# Patient Record
Sex: FEMALE | Race: WHITE | HISPANIC OR LATINO | Employment: UNEMPLOYED | URBAN - METROPOLITAN AREA
[De-identification: names, ages, dates, MRNs, and addresses within clinical notes are randomized per-mention and may not be internally consistent; named-entity substitution may affect disease eponyms.]

---

## 2020-07-17 ENCOUNTER — NURSE TRIAGE (OUTPATIENT)
Dept: OTHER | Facility: OTHER | Age: 25
End: 2020-07-17

## 2020-07-17 DIAGNOSIS — Z20.828 SARS-ASSOCIATED CORONAVIRUS EXPOSURE: Primary | ICD-10-CM

## 2020-07-17 DIAGNOSIS — Z20.828 SARS-ASSOCIATED CORONAVIRUS EXPOSURE: ICD-10-CM

## 2020-07-17 PROCEDURE — U0003 INFECTIOUS AGENT DETECTION BY NUCLEIC ACID (DNA OR RNA); SEVERE ACUTE RESPIRATORY SYNDROME CORONAVIRUS 2 (SARS-COV-2) (CORONAVIRUS DISEASE [COVID-19]), AMPLIFIED PROBE TECHNIQUE, MAKING USE OF HIGH THROUGHPUT TECHNOLOGIES AS DESCRIBED BY CMS-2020-01-R: HCPCS

## 2020-07-17 NOTE — TELEPHONE ENCOUNTER
Regarding: corona virus  ----- Message from Micky Rubi sent at 7/17/2020  2:55 PM EDT -----  Pt's employer is requiring she be covid 23 tested and she needs an order for testing at [facility]

## 2020-07-17 NOTE — TELEPHONE ENCOUNTER
Reason for Disposition   [1] COVID-19 infection suspected by caller or triager AND [2] mild symptoms (cough, fever, or others) AND [7] no complications or SOB    Additional Information   [1] Symptoms of COVID-19 (e g , cough, fever, SOB, or others) AND [2] lives in an area with community spread    Answer Assessment - Initial Assessment Questions  1  CLOSE CONTACT: "Who is the person with the confirmed or suspected COVID-19 infection that you were exposed to?"      Denies known exposure but has had some symptoms for the last week  2  PLACE of CONTACT: "Where were you when you were exposed to COVID-19?" (e g , home, school, medical waiting room; which city?)      Denies known exposure  3  TYPE of CONTACT: "How much contact was there?" (e g , sitting next to, live in same house, work in same office, same building)      Denies known positive exposure  4  DURATION of CONTACT: "How long were you in contact with the COVID-19 patient?" (e g , a few seconds, passed by person, a few minutes, live with the patient)      Denies  5  DATE of CONTACT: "When did you have contact with a COVID-19 patient?" (e g , how many days ago)      Denies  6  TRAVEL: "Have you traveled out of the country recently?" If so, "When and where?"      * Also ask about out-of-state travel, since the CDC has identified some high-risk cities for community spread in the [address] [place] [address],[address]  * Note: Travel becomes less relevant if there is widespread community transmission where the patient lives  Denies  7  COMMUNITY SPREAD: "Are there lots of cases of COVID-19 (community spread) where you live?" (See public health department website, if unsure)        Uncertain  8  SYMPTOMS: "Do you have any symptoms?" (e g , fever, cough, breathing difficulty)      Has had a cough, headache, and body aches for a week  Her work is asking that she get tested for COVID in order to return to work  9   PREGNANCY OR POSTPARTUM: "Is there any chance you are pregnant?" "When was your last menstrual period?" "Did you deliver in the last 2 weeks?"      Denies  10  HIGH RISK: "Do you have any heart or lung problems? Do you have a weak immune system?" (e g , CHF, COPD, asthma, HIV positive, chemotherapy, renal failure, diabetes mellitus, sickle cell anemia)        Denies      Protocols used: CORONAVIRUS (COVID-19) DIAGNOSED OR SUSPECTED-ADULT-AH, CORONAVIRUS (COVID-19) EXPOSURE-ADULT-AH

## 2020-07-23 LAB — SARS-COV-2 RNA SPEC QL NAA+PROBE: NOT DETECTED

## 2020-07-24 ENCOUNTER — NURSE TRIAGE (OUTPATIENT)
Dept: OTHER | Facility: OTHER | Age: 25
End: 2020-07-24

## 2020-07-24 NOTE — TELEPHONE ENCOUNTER
The patient was called for notification of a test result for COVID-19  The patient did not answer the phone and a voicemail was left requesting a call back to [phone], Option 7

## 2021-09-21 ENCOUNTER — ULTRASOUND (OUTPATIENT)
Dept: OBGYN CLINIC | Facility: CLINIC | Age: 26
End: 2021-09-21

## 2021-09-21 VITALS
HEART RATE: 93 BPM | WEIGHT: 220 LBS | BODY MASS INDEX: 36.65 KG/M2 | HEIGHT: 65 IN | DIASTOLIC BLOOD PRESSURE: 73 MMHG | SYSTOLIC BLOOD PRESSURE: 110 MMHG

## 2021-09-21 DIAGNOSIS — Z3A.27 27 WEEKS GESTATION OF PREGNANCY: ICD-10-CM

## 2021-09-21 DIAGNOSIS — O09.30 LATE PRENATAL CARE: ICD-10-CM

## 2021-09-21 DIAGNOSIS — O99.210 OBESITY IN PREGNANCY: ICD-10-CM

## 2021-09-21 DIAGNOSIS — Z30.09 GENERAL COUNSELING AND ADVICE ON FEMALE CONTRACEPTION: ICD-10-CM

## 2021-09-21 DIAGNOSIS — Z23 NEED FOR TDAP VACCINATION: ICD-10-CM

## 2021-09-21 DIAGNOSIS — Z11.3 SCREENING FOR STDS (SEXUALLY TRANSMITTED DISEASES): ICD-10-CM

## 2021-09-21 DIAGNOSIS — Z34.92 PRENATAL CARE, SECOND TRIMESTER: Primary | ICD-10-CM

## 2021-09-21 PROBLEM — Z98.891 HISTORY OF CESAREAN DELIVERY: Status: ACTIVE | Noted: 2021-09-21

## 2021-09-21 PROCEDURE — 90715 TDAP VACCINE 7 YRS/> IM: CPT

## 2021-09-21 PROCEDURE — 90471 IMMUNIZATION ADMIN: CPT

## 2021-09-21 PROCEDURE — 87591 N.GONORRHOEAE DNA AMP PROB: CPT | Performed by: OBSTETRICS & GYNECOLOGY

## 2021-09-21 PROCEDURE — 87491 CHLMYD TRACH DNA AMP PROBE: CPT | Performed by: OBSTETRICS & GYNECOLOGY

## 2021-09-21 PROCEDURE — 99214 OFFICE O/P EST MOD 30 MIN: CPT | Performed by: OBSTETRICS & GYNECOLOGY

## 2021-09-21 RX ORDER — SWAB
1 SWAB, NON-MEDICATED MISCELLANEOUS DAILY
COMMUNITY
End: 2022-06-30 | Stop reason: SDUPTHER

## 2021-09-21 NOTE — PATIENT INSTRUCTIONS
El embarazo de la semana 27 a la 30   CUIDADO AMBULATORIO:   Qué cambios están ocurriendo en hancock cuerpo: Usted podría notar síntomas nuevos margaret falta de [place] o inflamación de scott tobillos y pies  Es posible que también tenga dificultad para dormir o contracciones  Busque atención médica de inmediato si:  · Usted presenta un sosa dolor de emmanuel que no desaparece  · Usted tiene cambios en la visión nuevos o en aumento, margaret visión borrosa o con manchas  · Usted tiene inflamación nueva o creciente en hancock fausto o tejas  · Usted tiene manchado o sangrado vaginal     · Usted rompe gisela o siente un chorro o gotas de agua tibia que le está bajando por hancock vagina  Comuníquese con hancock médico si:  · Usted tiene más de 5 contracciones en 1 hora  · Usted nota algún cambio en los movimientos de hancock bebé  · Usted tiene calambres, presión o tensión abdominal     · Usted tiene un cambio en la secreción vaginal     · Usted tiene escalofríos o fiebre  · Usted tiene comezón, ardor o dolor vaginal     · Usted tiene fernando secreción vaginal amarillenta, verdosa, horacio o de [employer]  · Usted tiene dolor o ardor al [place], orina menos de lo habitual o tiene [place] rosada o sanguinolenta  · Usted tiene preguntas o inquietudes acerca de hancock condición o cuidado  Cómo cuidarse en esta etapa de hancock embarazo:  · Consuma alimentos saludables y variados  Alimentos saludables incluyen frutas, verduras, panes de alicia integral, alimentos lácteos bajos en grasa, frijoles, julieth magras y pescado  [place] líquidos margaret se le haya indicado  Pregunte cuánto líquido debe barbara cada día y cuáles líquidos son los más adecuados para usted  Limite el consumo de cafeína a menos de [address]  Limite el consumo de pescado a 2 porciones cada semana  Escoja pescado con concentraciones bajas de melissa margaret atún al natural enlatado, camarón, salmón, bacalao o tilapia   No coma pescado con concentraciones altas de melissa margaret pez ga, caballa gigante, pargo rayado y tiburón  · Controle la acidez comiendo 4 o 5 comidas pequeñas cada día en vez de comidas grandes  Evite los alimentos picantes  · Controle la inflamación al [facility] [employer] y poner los pies en alto o elevados sobre [place]  · [address]  Hancock necesidad de ciertas vitaminas y [facility], margaret el ácido fólico, aumenta barbara el [facility]  Las vitaminas prenatales proporcionan algunas de las vitaminas y minerales adicionales que usted necesita  Las vitaminas prenatales también podrían ayudar a disminuir el riesgo de ciertos defectos de nacimiento  · Consulte con hancock médico acerca de hacer ejercicio  El ejercicio moderado puede ayudarla a mantenerse en forma  Hancock médico la ayudará a planear un programa de ejercicios que sea seguro para usted barbara hancock [facility]  · No fume  Fumar aumenta el riesgo de aborto espontáneo y otros problemas de allison barbara hancock [facility]  Fumar puede causar que hancock bebé nazca antes de tiempo o que pese menos al nacer  Solicite información a hancock médico si usted necesita ayuda para dejar de fumar  · No consuma alcohol  El alcohol pasa de hancock cuerpo al bebé a través de la placenta  Puede afectar el desarrollo del cerebro de hancock bebé y provocar el síndrome de alcoholismo fetal (SAF)  El SAF es un brittni de condiciones que causan problemas [place], de comportamiento y de crecimiento  · Consulte con hancock médico antes de barbara cualquier medicamento  Muchos medicamentos pueden perjudicar a hancock bebé si usted los mallika [address] [place] [address]  No tome ningún medicamento, vitaminas, hierbas o suplementos sin karis consultar con hancock Jennifer Maid  use drogas ilegales o de la johnson (margaret marihuana o cocaína) mientras está embarazada  Consejos de seguridad barbara el embarazo:  · Evite jacuzzis y saunas   No use un jacuzzi o un sauna mientras usted está [place], especialmente barbara el primer trimestre  Los [employer] y los saunas aumentan la temperatura de hancock bebé y el riesgo de defectos de nacimiento  · Evite la toxoplasmosis  [place] es fernando infección causada por comer carne cruda o estar cerca del excremento de un akil infectado  [place] puede causar malformaciones congénitas, aborto espontáneo y Orville Schein  Lávese las tejas después de tocar carne cruda  Asegúrese de que la carne esté john cocida antes de comerla  Evite los huevos crudos y la Gaylan Karvonen  Use guantes o pida que alguien la ayude a limpiar la caja de arena del akil mientras usted Cynthea Patella  Cambios que están ocurriendo con hancock bebé: Para las 30 semanas, hancock bebé podría pesar más de 3 libras  Hancock bebé podría medir alrededor de 11 pulgadas de alex desde la punta de la emmanuel hasta la rabadilla (parte inferior del bebé)  Ahora hancock bebé puede abrir y cerrar scott ojos  Las patadas y movimientos de hancock bebé son más gabe en dinh momento  Lo que necesita saber acerca del cuidado prenatal: Hancock médico le revisará hancock presión arterial y [place]  Es posible que también necesite lo siguiente:  · Los análisis de neeraj podrían realizarse para revisar signos de anemia o el tipo de [place]  · Un examen de orina también podría realizarse para revisarle el azúcar y la proteína  Estas son señales de diabetes gestacional o de infección  La proteína en hancock orina también podría ser fernando señal de preeclampsia  La preeclampsia es fernando condición que puede desarrollarse barbara la semana 21 o después en hancock embarazo  Esta provoca presión arterial neftali y [place] [employer] [place] con scott riñones y [place]  · Fernando vacuna contra difteria, tétanos y tos ferina y vacuna contra la gripe podría ser recomendado por hancock médico     · La detección de diabetes gestacional se realizará usando fernando prueba oral de tolerancia a la glucosa   Fernando prueba oral de tolerancia a la glucosa comienza con fernando revisión de los niveles de azúcar en la [place] después de que usted no haya comido por 8 horas  Después se le da fernando bebida de glucosa  Le revisan el nivel de azúcar en la neeraj después de 1 hora, 2 horas y algunas veces 3 horas  Los médicos analizarán si el nivel de azúcar en la neeraj aumenta después del primer análisis  · La altura uterina es fernando medición del útero para controlar el desarrollo de hancock bebé  [employer] por lo general es igual al número de [address] que usted tiene de embarazo  Hancock médico también podría revisar la posición de hancock bebé  · El ritmo cardíaco de hancock bebé será revisado  © Copyright [employer]. 2021 Information is for End User's use only and may not be sold, redistributed or otherwise used for commercial purposes  All illustrations and images included in CareNotes® are the copyrighted property of A D A [employer]  or [address] [place] [address] es sólo para uso en educación  Hancock intención no es darle un consejo médico sobre enfermedades o tratamientos  Colsulte con hancock Monico Alanna farmacéutico antes de seguir cualquier régimen médico para saber si es seguro y efectivo para usted

## 2021-09-21 NOTE — ASSESSMENT & PLAN NOTE
Due to time constraints, did not get to discuss all matters regarding prenatal care   At next visit, please continue to discuss:  - TOLAC v RLTCS and other details regarding birth plan  - Labor precautions  [facility] delivery location   - COVID vaccination    Pap smear: patient reports Pap smear was negative early this year; she will bring records next visit

## 2021-09-21 NOTE — PROGRESS NOTES
OB/GYN  PRENATAL H&P VISIT  Matias Boggss  2021  Dr Ace Reagan MD      SUBJECTIVE  Patient is a [id number] here for initial prenatal H&P  She is late to prenatal care  This is an unintended and desired pregnancy  FOB is currently in the hospitals, but plans to be present for the delivery  Patient had Nexplanon removed in early April  She remembers being sexually active with boyfriend on   She reports LMP of 2021, which would have given JANNET of 3/4/22 and current EGA of 16w4d  Patient reports having a Pap smear done in April, reports results were negative  She did not realize she was pregnant until a positive pregnancy test last month  Prior to then she simply thought she was gaining weight  She had 2-4 days of brownish, dark discharge in , and was not sure if that was a period  She started to feel the baby move around 3 weeks ago  She is currently doing well  She denies vaginal bleeding, cramping, leakage, abnormal discharge, nausea, vomiting  Review of Systems   All other systems reviewed and are negative  ObGyn Hx:  OB History    Para Term  AB Living   2 1 1 0 0 1   SAB TAB Ectopic Multiple Live Births   0 0 0 0 1      # Outcome Date GA Lbr Ministerio/2nd Weight Sex Delivery Anes PTL Lv   2 Current            1 Term 14   3221 g (7 lb 1 6 oz) M CS-LTranv EPI  SANJUANITA      Complications: Failure to Progress in First Stage       Hx STI: reports positive history in the past, treated    PMH:  No past medical history on file  Reports no significant medical history     Hx Tb/close contacts with persons with TB: no   Hx MRSA: no    Immunity to varicella: unknown    Family Hx:  No family history on file  She denies a family history of inheritable conditions such as physical or intellectual disabilities, birth defects, blood disorders, heart or neural tube defects        Current Medications:    Current Outpatient Medications:     Prenatal Vit-Fe Fumarate-FA (prenatal multivitamin) 28-0 8 MG TABS, Take 1 tablet by mouth daily, Disp: , Rfl:     Surgical Hx:  No past surgical history on file  Social Hx:  Tobacco use: no  Alcohol use: no  Other substance use: no  Currently employment: did not get to ask; currently self pay   Currently lives with her son  Support system includes family  She does feel safe at home    Hx mental health issues: no  Recent travel or planned travel in near future: [facility] earlier this year     OBJECTIVE  Vitals:    09/21/21 1446   BP: 110/73   Pulse: 93     Physical Exam  Constitutional:       Appearance: Normal appearance  She is obese  She is not ill-appearing or diaphoretic  Comments: Pleasant female   HENT:      Head: Normocephalic and atraumatic  Eyes:      Conjunctiva/sclera: Conjunctivae normal       Pupils: Pupils are equal, round, and reactive to light  Cardiovascular:      Rate and Rhythm: Normal rate  Pulmonary:      Effort: Pulmonary effort is normal  No respiratory distress  Abdominal:      General: There is distension (visibly gravid uterus)  Palpations: Abdomen is soft  Tenderness: There is no abdominal tenderness  There is no guarding  Genitourinary:     Comments: Normal appearing external genitalia, vaginal mucosa  High cervix, difficulty to entirely visualize on speculum exam but otherwise appears normal  No evidence of bleeding  Normal physiologic discharge present  On bimanual exam, uterus roughly 27 weeks in size, with no palpable adnexal masses  Musculoskeletal:         General: Normal range of motion  Cervical back: Normal range of motion  Skin:     General: Skin is warm and dry  Neurological:      General: No focal deficit present  Mental Status: She is alert and oriented to person, place, and time  Mental status is at baseline  Psychiatric:         Mood and Affect: Mood normal          Behavior: Behavior normal          Thought Content:  Thought content normal  Fetal Biometry:  FL: 4 72cm - 25w5d  AC: 24 20cm - 28w3d  HC: 25 99 cm - 28w2d  BPD: 7 28cm - 29w1d  FHT: 137 bpm using TAUS                       ASSESSMENT AND PLAN    30 y o , , with /73   Pulse 93   Ht 5' 5" (1 651 m)   Wt 99 8 kg (220 lb)   LMP 2021 , at roughly 27 weeks gestation, presents late to prenatal care  Viability scan via TAUS/biometry and H&P completed today  Problem List        Other    Prenatal care, second trimester    Overview     Continue prenatal vitamins   Delivery consent signed 470  Tdap given   BP's WNL thus far in pregnancy   28-week education packet reviewed with RN   F/u prenatal labs, G/C results  Patient past window for most genetic screening options but could have NIPT done  Will continue to discuss  Referral to [facility] placed          Current Assessment & Plan     Due to time constraints, did not get to discuss all matters regarding prenatal care  At next visit, please continue to discuss:  - TOLAC v RLTCS and other details regarding birth plan  - Labor precautions  [place] delivery location   - COVID vaccination    Pap smear: patient reports Pap smear was negative early this year; she will bring records next visit          27 weeks gestation of pregnancy    Overview     RTC 1 week for nursing intake   RTC Q2 weeks for routine prenatal care         History of C/S x 1    Overview     2014: 1LTCS for failed IOL  Patient reports being induced and not dilating past 3 cm    Undecided on TOLAC v RLTCS         Late prenatal care    Overview     Patient initially presented on  for first prenatal visit   Referral to [facility] for detailed anatomy scan          General counseling and advice on female contraception    Overview     Patient undecided, will continue to discuss          Obesity in pregnancy      Other Visit Diagnoses     Need for Tdap vaccination        Screening for STDs (sexually transmitted diseases)              Kenisha Eddy, MD  9/21/2021  7:07 PM

## 2021-09-21 NOTE — PROGRESS NOTES
28 week education packet provided to patient on 09/21/21  Included in packet:   Third Trimester paperwork  Delivery consent   Birthing room support person rules and acknowledgment  Birth Plan   Welcome information  Birth certificate worksheet   Consent for Photographers  Perineal/ Vaginal massage   Pediatric practices and locations

## 2021-09-23 LAB
C TRACH DNA SPEC QL NAA+PROBE: NEGATIVE
N GONORRHOEA DNA SPEC QL NAA+PROBE: NEGATIVE

## 2021-09-24 ENCOUNTER — TELEPHONE (OUTPATIENT)
Dept: OBGYN CLINIC | Facility: CLINIC | Age: 26
End: 2021-09-24

## 2021-09-24 ENCOUNTER — APPOINTMENT (OUTPATIENT)
Dept: LAB | Facility: HOSPITAL | Age: 26
End: 2021-09-24

## 2021-09-24 DIAGNOSIS — R82.71 ASYMPTOMATIC BACTERIURIA DURING PREGNANCY: Primary | ICD-10-CM

## 2021-09-24 DIAGNOSIS — O99.891 ASYMPTOMATIC BACTERIURIA DURING PREGNANCY: Primary | ICD-10-CM

## 2021-09-24 DIAGNOSIS — Z34.92 PRENATAL CARE, SECOND TRIMESTER: ICD-10-CM

## 2021-09-24 LAB
ABO GROUP BLD: NORMAL
BASOPHILS # BLD AUTO: 0.02 THOUSANDS/ΜL (ref 0–0.1)
BASOPHILS NFR BLD AUTO: 0 % (ref 0–1)
BILIRUB UR QL STRIP: NEGATIVE
BLD GP AB SCN SERPL QL: NEGATIVE
CLARITY UR: NORMAL
COLOR UR: YELLOW
EOSINOPHIL # BLD AUTO: 0.01 THOUSAND/ΜL (ref 0–0.61)
EOSINOPHIL NFR BLD AUTO: 0 % (ref 0–6)
ERYTHROCYTE [DISTWIDTH] IN BLOOD BY AUTOMATED COUNT: 14 % (ref 11.6–15.1)
GLUCOSE UR STRIP-MCNC: NEGATIVE MG/DL
HBV SURFACE AG SER QL: NORMAL
HCT VFR BLD AUTO: 31 % (ref 34.8–46.1)
HGB BLD-MCNC: 10 G/DL (ref 11.5–15.4)
HGB UR QL STRIP.AUTO: NEGATIVE
IMM GRANULOCYTES # BLD AUTO: 0.05 THOUSAND/UL (ref 0–0.2)
IMM GRANULOCYTES NFR BLD AUTO: 1 % (ref 0–2)
KETONES UR STRIP-MCNC: NEGATIVE MG/DL
LEUKOCYTE ESTERASE UR QL STRIP: NEGATIVE
LYMPHOCYTES # BLD AUTO: 1.36 THOUSANDS/ΜL (ref 0.6–4.47)
LYMPHOCYTES NFR BLD AUTO: 17 % (ref 14–44)
MCH RBC QN AUTO: 29.6 PG (ref 26.8–34.3)
MCHC RBC AUTO-ENTMCNC: 32.3 G/DL (ref 31.4–37.4)
MCV RBC AUTO: 92 FL (ref 82–98)
MONOCYTES # BLD AUTO: 0.34 THOUSAND/ΜL (ref 0.17–1.22)
MONOCYTES NFR BLD AUTO: 4 % (ref 4–12)
NEUTROPHILS # BLD AUTO: 6.41 THOUSANDS/ΜL (ref 1.85–7.62)
NEUTS SEG NFR BLD AUTO: 78 % (ref 43–75)
NITRITE UR QL STRIP: NEGATIVE
NRBC BLD AUTO-RTO: 0 /100 WBCS
PH UR STRIP.AUTO: 6 [PH]
PLATELET # BLD AUTO: 179 THOUSANDS/UL (ref 149–390)
PMV BLD AUTO: 8.7 FL (ref 8.9–12.7)
PROT UR STRIP-MCNC: NEGATIVE MG/DL
RBC # BLD AUTO: 3.38 MILLION/UL (ref 3.81–5.12)
RH BLD: POSITIVE
RPR SER QL: NORMAL
RUBV IGG SERPL IA-ACNC: 64.2 IU/ML
SP GR UR STRIP.AUTO: 1.02 (ref 1–1.03)
SPECIMEN EXPIRATION DATE: NORMAL
UROBILINOGEN UR QL STRIP.AUTO: 1 E.U./DL
VZV IGG SER IA-ACNC: NORMAL
WBC # BLD AUTO: 8.19 THOUSAND/UL (ref 4.31–10.16)

## 2021-09-24 PROCEDURE — 36415 COLL VENOUS BLD VENIPUNCTURE: CPT

## 2021-09-24 PROCEDURE — 81003 URINALYSIS AUTO W/O SCOPE: CPT

## 2021-09-24 PROCEDURE — 87086 URINE CULTURE/COLONY COUNT: CPT

## 2021-09-24 PROCEDURE — 80081 OBSTETRIC PANEL INC HIV TSTG: CPT

## 2021-09-24 PROCEDURE — 86787 VARICELLA-ZOSTER ANTIBODY: CPT

## 2021-09-24 RX ORDER — CEPHALEXIN 500 MG/1
500 CAPSULE ORAL EVERY 6 HOURS SCHEDULED
Qty: 28 CAPSULE | Refills: 0 | Status: SHIPPED | OUTPATIENT
Start: 2021-09-24 | End: 2021-09-28 | Stop reason: CLARIF

## 2021-09-24 NOTE — TELEPHONE ENCOUNTER
----- Message from Oleg Sequeira MD sent at 9/24/2021 11:58 AM EDT -----  Patient has nitirites in her urine on prenatal panel; likely UTI  Sent Kelfex to pharmacy listed in her chart  Please call her and notify her of urine infection, and advise her to  antibiotics and start them ASAP  Keflex 500mg Q6h x 7 days

## 2021-09-24 NOTE — TELEPHONE ENCOUNTER
Called patient to notify of results and to relate that antibiotic was sent to the pharmacy  Left message with call back telephone number

## 2021-09-24 NOTE — LETTER
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

## 2021-09-24 NOTE — PROGRESS NOTES
Patient has nitrites noted in urine on UA from prenatal panel  Likely UTI/aysmptomatic bacteruria  Will send Keflex 500mg Q6h x 7 days to pharmacy   Will await urine culture and sensitivities to assess need for antibiotic adjustment     Addendum 9/28/2021: On review of prenatal labs, patient does NOT have nitrites in her urine   She does not have asymptomatic bacteruria and does not need antibiotic treatment

## 2021-09-26 LAB — HIV 1+2 AB+HIV1 P24 AG SERPL QL IA: NORMAL

## 2021-09-27 ENCOUNTER — ROUTINE PRENATAL (OUTPATIENT)
Dept: PERINATAL CARE | Facility: OTHER | Age: 26
End: 2021-09-27
Payer: COMMERCIAL

## 2021-09-27 VITALS
WEIGHT: 220.6 LBS | HEIGHT: 65 IN | DIASTOLIC BLOOD PRESSURE: 75 MMHG | HEART RATE: 92 BPM | BODY MASS INDEX: 36.75 KG/M2 | SYSTOLIC BLOOD PRESSURE: 112 MMHG

## 2021-09-27 DIAGNOSIS — O99.213 MATERNAL OBESITY, ANTEPARTUM, THIRD TRIMESTER: Primary | ICD-10-CM

## 2021-09-27 DIAGNOSIS — Z3A.28 28 WEEKS GESTATION OF PREGNANCY: ICD-10-CM

## 2021-09-27 DIAGNOSIS — O09.33 LATE PRENATAL CARE AFFECTING PREGNANCY IN THIRD TRIMESTER: ICD-10-CM

## 2021-09-27 DIAGNOSIS — Z98.891 HISTORY OF CESAREAN DELIVERY: ICD-10-CM

## 2021-09-27 LAB — BACTERIA UR CULT: NORMAL

## 2021-09-27 PROCEDURE — 76811 OB US DETAILED SNGL FETUS: CPT | Performed by: OBSTETRICS & GYNECOLOGY

## 2021-09-27 PROCEDURE — 99241 PR OFFICE CONSULTATION NEW/ESTAB PATIENT 15 MIN: CPT | Performed by: OBSTETRICS & GYNECOLOGY

## 2021-09-27 NOTE — LETTER
September 27, 2021     [address] PROVIDER    Patient: Orville Gomez   YOB: 1995   Date of Visit: 9/27/2021       Dear   Provider: Thank you for referring Mariajose Figueredo to me for evaluation  Below are my notes for this consultation  If you have questions, please do not hesitate to call me  I look forward to following your patient along with you  Sincerely,        Melva Clifton MD        CC: No Recipients  Melva Clifton MD  9/26/2021  5:55 PM  Sign when Signing Visit  Please refer to the [facility] ultrasound report in Ob Procedures for additional information regarding today's visit

## 2021-09-27 NOTE — PATIENT INSTRUCTIONS
Conteo de patadas en el embarazo   LO QUE NECESITA SABER:   El conteo de patadas mide cuánto se está moviendo hancock bebé en el útero  Fernando patada de hancock bebé podría sentirse margaret fernando torcedura, fernando vuelta, un crujido, un meneo o un golpe  Es común sentir a tu bebé patear a las 32 a 29 semanas de [place]  Es posible que sienta al bebé patear ya a las 20 semanas de [place]  Puede que desee empezar a contar a las 28 semanas  INSTRUCCIONES SOBRE EL [facility] HOSPITALARIA:   Comuníquese inmediatamente con hancock médico si:  · Usted siente un cambio en el número de patadas o movimientos de hancock bebé  · Siente menos de 10 patadas en 2 horas  · Usted tiene preguntas o inquietudes acerca de los movimientos de hancock bebé  Por qué realizar el conteo de patadas: Los movimientos de hancock bebé podrían proporcionar información de la allison de hancock bebé  Es posible que si hay problemas, hancock bebé se mueva menos o nada en lo absoluto  El bebé podría moverse menos si no recibe suficiente oxígeno o alimento de la placenta  No fume mientras está embarazada  Fumar disminuye la cantidad de oxígeno que llega a hancock bebé  Hable con hancock médico si necesita ayuda para dejar de fumar  Los problemas que se encuentran en fernando etapa más temprana son más fáciles de tratar  Cuándo realizar el conteo de patadas:  · Cuente las patadas en el mismo horario todos los GRASSE  · Realice el conteo de las patadas cuando hancock bebé esté despierto y [place]  Hancock bebé podría estar más activo en la tarde  Cómo realizar el conteo de patadas: Revise que hancock bebé esté despierto antes de realizar el conteo de patadas  Usted puede despertar a hancock bebé empujando hancock estómago suavemente, caminando o tomando algo frío  Hancock médico podría indicarle diferentes maneras de realizar el conteo  Es posible que le indique que hawa lo siguiente:  · Use fernando gráfica o un reloj para mantener un registro de la hora en que comienza y termina de contar      · Siéntese en fernando silla o acuéstese en hancock costado derecho  · Coloque scott tejas en la parte más staci de ayala BJURHOLM  · Cuente hasta que llegue a las 10 patadas  Escriba cuánto tiempo le lleva contar las 10 patadas  · Podría barbara de 30 minutos a 2 horas para contar 10 patadas  No debería de barbara más de 2 horas para contar 10 patadas  Acuda a scott consultas de control con ayala médico según le indicaron  Anote scott preguntas para que se acuerde de hacerlas barbara scott visitas  © Copyright [address] 2021 Information is for End User's use only and may not be sold, redistributed or otherwise used for commercial purposes  All illustrations and images included in CareNotes® are the copyrighted property of A D A [employer]  or [address] [place] [address] es sólo para uso en educación  Ayala intención no es darle un consejo médico sobre enfermedades o tratamientos  Colsulte con ayala Dorna Arcadio farmacéutico antes de seguir cualquier régimen médico para saber si es seguro y efectivo para usted

## 2021-09-28 ENCOUNTER — DOCUMENTATION (OUTPATIENT)
Dept: OBGYN CLINIC | Facility: CLINIC | Age: 26
End: 2021-09-28

## 2021-09-28 ENCOUNTER — INITIAL PRENATAL (OUTPATIENT)
Dept: OBGYN CLINIC | Facility: CLINIC | Age: 26
End: 2021-09-28

## 2021-09-28 VITALS
HEIGHT: 65 IN | DIASTOLIC BLOOD PRESSURE: 60 MMHG | HEART RATE: 92 BPM | BODY MASS INDEX: 36.75 KG/M2 | SYSTOLIC BLOOD PRESSURE: 92 MMHG | WEIGHT: 220.6 LBS

## 2021-09-28 DIAGNOSIS — Z3A.29 29 WEEKS GESTATION OF PREGNANCY: Primary | ICD-10-CM

## 2021-09-28 PROCEDURE — OBC

## 2021-09-28 NOTE — LETTER
[address]  1995  [address] [facility]       09/28/21          Edwin Alarcon is a patient and under our care in our office  Edwin Souza's Estimated Date of Delivery: 12/10/21  Any questions or concerns feel free to contact our office       Thank you,    Karina Valles, [address] [facility]/[place]  [id number] [facility] [address]  [address] [facility]/[address]al [address]  [facility]/[address]  [phone]

## 2021-09-28 NOTE — PROGRESS NOTES
OB Nursing Intake   Patient presents for OB intake @ 29w4d    Vitals for current encounter: Blood pressure 92/60, pulse 92, height 5' 5" (1 651 m), weight 100 kg (220 lb 9 6 oz), last menstrual period 2021, not currently breastfeeding  BMI Counseling: Body mass index is 36 71 kg/m²  is above normal  Nutrition recommendations include 3-5 servings of fruits/vegetables daily  Hx of  delivery prior to 36 weeks 6 days:  No      OB History    Para Term  AB Living   2 1 1 0 0 1   SAB TAB Ectopic Multiple Live Births   0 0 0 0 1      # Outcome Date GA Lbr Ministerio/2nd Weight Sex Delivery Anes PTL Lv   2 Current            1 Term 14   3221 g (7 lb 1 6 oz) M CS-LTranv EPI  SANJUANITA      Complications: Failure to Progress in First Stage       Last Menstrual Period:    Patient's last menstrual period was 2021  Ultrasound date: 21  29 weeks 3 days    Estimated Date of Delivery: 12/10/2021 Confirmed by Ultrasound     Last pap smear:  Unknown, no records available  Pt to bring pap record at next visit  Current Issues:  Constipation :   No  Headaches :   No  Cramping:  No  Spotting :   No  PICA cravings :  No  FOB Involved:   Yes  Planned pregnancy:  No      I have these concerns about this prenatal patient: Late prenatal care  Pt hgb 10 on prenatal panel  Patient advised to take extra iron supplement and Colace for preventative constipation  Patient to follow up with provider for further discussions  Patient with primary family history of diabetes, 1 hour Gtt ordered  Interview education   [facility] Pregnancy Essentials reviewed and discussed    [facility] Handout   [facility] Handout   Discussed genetic testing   Prenatal lab work: Scripts printed and given to pt   Influenza vaccine given today: No   Discussed Tdap vaccine         Immunizations:   Immunization History   Administered Date(s) Administered    DTaP 1994, 1994, 05/24/1994, 06/25/1995, 05/25/1996    H1N1, All Formulations 10/28/2009    HPV Quadrivalent 07/07/2009, 04/14/2010, 05/31/2011    Hep B, Adolescent or Pediatric 07/07/2009, 04/14/2010, 10/20/2010    Hepatitis A 04/14/2010, 05/31/2011    INFLUENZA 10/28/2009, 10/22/2010, 11/21/2014    IPV 01/18/1994, 03/18/1994, 04/24/1994, 05/25/1999    MMR 07/07/2009, 04/14/2010    Meningococcal MCV4P 07/07/2009    Td (adult), adsorbed 12/22/1999    Tdap 04/14/2010, 09/21/2021    Tuberculin Skin Test-PPD Intradermal 11/21/2014    Varicella 04/07/2009, 04/14/2010         Depression Screening Follow-up Plan: Patient's depression screening was negative with an Parker score of  8  Clinically patient does not have depression  No treatment is required       Nurse/Family Partnership- referral placed:  No     Tobacco Cessation Counseling: non-smoker      Infection Screening: Does the pt have a hx of MRSA? No  The patient was oriented to our practice and all questions were answered  H&P visit scheduled on: 10/6/21 @ [phone]  with Dr Lisette Arzola by:  Rodríguez Petersen RN 09/28/21

## 2021-09-28 NOTE — LETTER
Proof of Pregnancy Letter    Edwin Toribio  1995  [address] [facility]        09/28/21      Edwin Toribio is a patient at our facility  [address] Estimated Date of Delivery: 12/10/2021       Any questions or concerns, please feel free to contact our office      Sincerely,    Joe Up RN

## 2021-09-28 NOTE — PATIENT INSTRUCTIONS
[place] 1925 ¡Felicitaciones por Tamsen Hiro! Nos complace que nos haya elegido para ser hancock proveedor de scott servicios de allison médica barbara hancock Jerral Ruts  Hancock allison, la allison de hancock hijo por nacer (niños) y hancock bre son importante para nosotros  Ahora, más que [place], hancock allison será lo más importante para usted  El crecimiento y el progreso de hancock bebé pueden verse afectados por la forma en que usted se cuide  Es Carren Quince buena idea planificar con anticipación  Es un hecho conocido que las mujeres que reciben atención mèdica desde temprano en  Jolinda Radon y barbara todo el embarazo tienen bebés más saludables  Se programarán visitas para usted barbara todo Jolinda Radon  Es hancock deber cumplir con scott citas y seguir las instrucciones para hancock cuidado  El Geoff Casino de visitas será decidido por hancock médico  No tengas miedo de hacer preguntas  Hable con scott enfermeras y proveedores sobre scott planes de parto y cualquier inquietud que pueda tener  [place] de Verificación  ; Medicina Materno Fetal (MFM) al [phone] para programar hancock cony   o Cony de 1 hora, solo se permite 1 persona de apoyo, NO niños    ; Análisis de neeraj: complete antes de hancock cony de H&P   NO tiene que ayunar para ningún análisis de neeraj a menos que se lo indiquen  - CBC, Tipo de [place] y 200 [employer] de anticuerpos, Análisis de [place], [place] de glucosa al richard, VIH, sífilis, hepatitis B    ; Ben [place] Cocker y física: cony de H&P   examen físico   Examen pélvico: Villatoro Orn y Clamidia   Si es necesario: Papanicolaou    Plan:  ; Visitas prenatales de rutina cada 4 semanas hasta las 28 semanas   En scott visitas prenatales:  - Monitoreo de los el latidos del corazón del bebé y medir hancock josé miguel en crecimiento, Recolecte fernando [facility], y se tomara hancock peso y presión arterial      Señales de Alerta en el embarazo: Anguillan Kidney  [phone]    1  Sangrado vaginal  2   Dolor abdominal litzy que no desaparece  3  Fiebre (más de 100 4 y no se kay con Tylenol)  4  Vómitos persistentes que mathews más de 24 horas  5  dolor de pecho  6  Dolor o ardor al orinar  7  Dolor de emmanuel severo que no se resuelve con Tylenol  8  Visión borrosa o addi puntos en hancock visión  9  Hinchazón repentina de hancock fausto o tejas  10  Enrojecimiento, hinchazón o dolor en fernando pierna  11  Un aumento de peso repentino en pocos días  12  Contar los movimientos fetales del bebé  (después de 28 semanas o el sexto mes de embarazo)  15  Fernando pérdida de líquido acuoso de la vagina: puede ser un chorro, un goteo o fernando humedad continua  14  Después de 20 semanas de embarazo, calambres rítmicos (más de 4 por hora) o menstruales margaret dolor bajo / pélvico      Manténgase saludable barbara hancock embarazo:   · Consuma alimentos saludables y variados  Alimentos saludables incluyen frutas, verduras, panes de alicia integral, alimentos lácteos bajos en grasa, frijoles, julieth magras y pescado  [place] líquidos margaret se le haya indicado  Pregunte cuánto líquido debe barbara cada día y cuáles líquidos son los más adecuados para usted  o Cafeína: no está zan cómo la cafeína afecta el embarazo  Limite hancock consumo de cafeína para evitar posibles problemas de allison   - Limite la cafeína a menos de 200 miligramos por día  - La cafeína se puede encontrar en el café, el té, la cola, las bebidas deportivas y el chocolate  o  Alimentos que contienen melissa: el melissa se encuentra naturalmente en lorraine todos los tipos de pescados y mariscos  Algunos tipos de peces absorben niveles más altos de melissa que pueden ser dañinos para un bebé shiela  Coma solo pescado y mariscos bajos en melissa  - Limite hancock consumo de pescado a 2 porciones por semana    - Elija pescado con bajo contenido de melissa, margaret atún zan enlatado, camarones, cangrejo, salmón, bacalao o tilapia   o No coma pescado con alto contenido de [employer] pez ga, el pez Jared monroe, la caballa real y el tiburón  - Cada semana, puede comer hasta 12 onzas de pescado o mariscos que tienen bajos niveles de [employer]  Estos incluyen camarones, atún zan enlatado, salmón, abadejo y [place]  o Coma solo 6 onzas de atún minaya (minaya) por semana  El atún minaya tiene más melissa que el atún [place]  · [address] [place]  Hancock necesidad de ciertas vitaminas y [facility], margaret el ácido fólico, aumenta barbara el [facility]  Las vitaminas prenatales proporcionan algunas de las vitaminas y minerales adicionales que usted necesita  Las vitaminas prenatales también podrían ayudar a disminuir el riesgo de ciertos defectos de nacimiento  · Pregunte cuánto peso usted debe aumentar barbara hancock embarazo  Demasiado aumento de peso o muy poco puede ser poco saludable para usted y hancock bebé  · Ejercicio: hable con hancock proveedor de [employer] ejercicio  El ejercicio moderado puede ayudarlo a mantenerse en forma  Hancock proveedor de [employer] ayudará a planificar un programa de ejercicios que sea seguro para usted barbara el [facility]  · Stacey muchos líquidos mientras hace ejercicio para mantenerse hidratado  Tenga cuidado para evitar el sobrecalentamiento  o EVITE los ejercicios que pueden hacer que pierda el equilibrio   o Actividades que pueden poner a hancock bebé en riesgo, es decir, montar a juliane, bucear, esquiar o hacer snowboard  o Después de hancock primer trimestre, evite los ejercicios que requieren que se acueste boca arriba   o EVITE exceder fernando frecuencia cardíaca mayor de 140 latidos por minuto  Siempre que pueda mantener fernando conversación mientras hace ejercicio, es probable que hancock ritmo cardíaco sea aceptable   ; Siempre use el cinturón de seguridad   El cinturón de hombro debe estar sobre el pecho (entre los senos) y lejos del olvin     Asegure el cinturón de regazo debajo de hancock vientre para que se ajuste cómodamente en scott caderas y Phan pélvico   ; Realizar el ejercicio de [place]   Imagínese deteniendo el flujo de orina, contrayendo los músculos de hancock piso pélvico  Mantenga maximilian contracción barbara 10 segundos y suelte   Se pueden repetir 10-20 veces por día  · No fume  Si usted fuma, nunca es demasiado tarde para dejar de hacerlo  Fumar aumenta el riesgo de aborto espontáneo y otros problemas de allison barbara hancock Leata Spire  Fumar puede causar que hancock bebé nazca antes de tiempo o que pese menos al nacer  Solicite información a hancock médico si usted necesita ayuda para dejar de fumar  · No consuma alcohol  El alcohol pasa de hancock cuerpo al bebé a través de la placenta  Puede afectar el desarrollo del cerebro de hancock bebé y provocar el síndrome de alcoholismo fetal (SAF)  SAF es un brittni de condiciones que causan [address], de comportamiento y de crecimiento  · Consulte con hancock médico antes de barbara cualquier medicamento  Muchos medicamentos pueden perjudicar a hancock bebé si usted los mallika [address]  No tome ningún medicamento, vitaminas, hierbas o suplementos sin karis consultar con hancock médico    · Nunca  use drogas ilegales o de la johnson (margaret marihuana o cocaína) mientras está embarazada  Consejos de seguridad:   · Evite jacuzzis y saunas  No use un jacuzzi o un sauna mientras usted está embarazada, especialmente barbara el primer trimestre  Los [facility] y los saunas aumentan la temperatura de hancock bebé y el riesgo de defectos de nacimiento  · Evite la toxoplasmosis  Betances es fernando infección causada por comer carne cruda o estar cerca del excremento de un akil infectado  Betances puede causar malformaciones congénitas, aborto espontáneo y Orville Schein  Lávese las tejas después de tocar carne cruda  Asegúrese de que la carne esté john cocida antes de comerla  Evite los huevos crudos y la Elana Days  Use guantes o pida que alguien la ayude a limpiar la caja de arena del akil mientras usted Josh Snuffer     · Consulte con hancock médico acerca de viajar  El [address] cómodo para viajar es barbara el emily trimestre  Pregunte a hancock médico si usted puede viajar después de las 36 semanas  Es posible que no pueda viajar en avión después de las 36 [address]  También le puede recomendar que evite largos viajes por carretera  Programe un control con hancock médico u obstetra según lo indicado:  Vaya a todas miranda citas prenatales barbara hancock embarazo  Anote miranda preguntas para que se acuerde de hacerlas barbara miranda visitas  [place] y vómito en el embarazo       CUIDADO AMBULATORIO:   La náusea y el vómito en el embarazo  pueden suceder a cualquier hora del día  Estos síntomas usualmente comienzan antes de la semana 9 del embarazo y terminan para la semana 14 (emily trimestre)  Algunas mujeres pueden tener náusea o vómito por un tiempo prolongado  Estos síntomas pueden afectar a algunas mujeres barbara el embarazo  La náusea y el vómito no dañan a hancock bebé  Estos síntomas pueden dificultarle miranda actividades diarias  Pregúntele a hancock Brooke Ricardo vitaminas y minerales son adecuados para usted  · Usted vomita más de 4 veces en 1 día  · Usted no ha podido retener líquidos en el estómago por más de 1 día  · Usted pierde más de 2 libras  · Usted tiene fiebre  · Miranda náuseas y vómito continúan por más de 14 semanas  · Usted tiene preguntas o inquietudes acerca de hancock condición o cuidado  El tratamiento  para la náusea y el vómito en el embarazo generalmente no es necesario  Usted puede [employer] alimentos que come y en miranda actividades para ayudar a controlar miranda síntomas  Es posible que usted necesite probar varias cosas para determinar qué funciona mejor para usted  Hable con hancock médico si miranda síntomas no mejoran con los cambios que se recomiendan a continuación  Es posible que usted necesite vitamina B6 y medicamento si estos cambios no ayudan o si miranda síntomas se vuelven graves     Cambios de nutrición que usted puede realizar para controlar la náusea y el vómito:   · Coma porciones pequeñas barbara el día en vez de 3 comidas con porciones grandes  Es más probable que usted tenga náusea y vómito cuando hancock estómago está vacío  Consuma alimentos bajos en grasa y ricos en proteínas  Ejemplos son Lyssa Larson, frijoles, pavo y luis sin fadumo Jay, margaret galletas saladas, cereal seco o un sandwich chico antes de [place]  · Coma galletas saladas o pan eliezer antes de levantarse de hancock cama por la mañana  Levántese de la cama lentamente  Los movimientos repentinos podrían provocarle mareos y [place]  · Consuma alimentos blandos cuando se sienta con náuseas  Ejemplos de alimentos blandos son el pan eliezer, cereal seco, pasta sin Aarti Tavia y wells  Otros alimentos blandos incluyen a las [employer], plátanos, gelatina y pretzels  Evite los alimentos condimentados, grasosos y fritos  Evite otros alimentos que le provoquen náuseas  · [place] líquidos que contengan gengibre  [place] refresco de gengibre hecho con gengibre real o té de gengibre hecho con gengibre fresco rallado  Las [employer] o dulces de gengibre también podrían ayudar a aliviar la náusea y el vómito  · [place] líquidos entre alimentos en vez de tomarlos con los alimentos  [place]spe[place] al menos 30 minutos después de comer para barbara líquidos  [place] cantidades pequeñas de líquidos con frecuencia barbara el día para evitar la deshidratación  Consulte cuál es la cantidad de líquido que usted debería consumir al día  Otros cambios que usted puede realizar para controlar la náusea y el vómito:   · Evite los olores que la [place]  Los olores gabe podrían provocar que [employer] náuseas y el vómito, o podrían empeorarlo  Camine un poco, prenda un ventilador o trate de dormir con la ventana abierta para respirar aire fresco  Cuando esté cocinando, brandon las ventanas para eliminar el olor que podría provocarle náuseas    · No se cepille scott dientes inmediatamente después de comer  si eso le provoca náuseas  · Descanse cuando lo necesite  Comience fernando actividad lentamente y vuelva a hancock rutina normal conforme se empiece a sentir mejor  · Hable con hancock médico acerca de las vitaminas prenatales  Las vitaminas prenatales pueden provocar náuseas a algunas mujeres  Trate de tomárselas por la noche o con un bocadillo  Si dinh cambio no le [facility], hancock médico podría recomendarle un tipo de vitamina diferente  · No use ningún medicamento, vitamina o suplemento para controlar scott síntomas sin antes consultarlo con hancock médico   Varios medicamentos pueden dañar a hancock bebé que no ha nacido  · El ejercicio de ligero a moderado  podría ayudar a aliviar scott síntomas  También podría ayudarla a dormir mejor por la noche  Pregunte a hancock médico acerca del mejor plan de ejercicio para usted  Beneficios de la lactancia materna   son menos propensos a desarrollar alergias   Tienen fernando mayor protección contra la meningitis bacteriana   Tienen menos infecciones del oído medio   Tienen menos dificultades de aprendizaje    Tienen menos dificultades de comportamiento   Tienen un coeficiente intelectual más alto   Tienen tasas más bajas de enfermedades respiratorias   Tienen chayito obesidad    Son menos propensos a desarrollar diabetes juvenil y algunos cánceres infantiles   Tienen menos probabilidades de morir por Síndrome de ertBayCare Alliant Hospital   Un bebé más saludable significa menos visitas al médico y a la farmacia   La lactancia materna es ecológica  No hay nada que tirar   La lactancia materna es gratis; La fórmula puede costar más de $ 1000 barbara el primer año de shahid   Hay menos sangrado vaginal inmediatamente después del parto y un retorno más rápido a hancock tamaño anterior al [facility]  Las [place] que amamantan barbara un total de 2 años tienen  ;  Fernnado disminución del 40% en el riesgo de cáncer de seno  ; Disminución del riesgo de cáncer de ovario   ; Tasas más bajas de osteoporosis, diabetes y enfermedades del corazón  Importancia de la lactancia materna exclusiva   Al proporcionar el alimento ideal para el crecimiento y desarrollo saludable de los bebés, solo se les alimenta con [place], esto es amamantamiento exclusivo   La lactancia materna [facility] primeros 6 meses es muy importante para mejorar la allison de un bebé y reducir las enfermedades infantiles  Lactancia materna exclusiva barbara los primeros 6 meses  [facility] Est[facility]ounid[facility] de Pediatría recomienda la lactancia materna exclusiva barbara los primeros seis meses y la lactancia materna continua con suplementos de sólidos barbara los próximos 6 meses  Inicio temprano de la lactancia materna   Las mujeres que alimentan a scott bebés dentro de la primera hora de nacimiento se conocen margaret inicio temprano de la lactancia materna y aseguran que el recién nacido reciba calostro   El calostro es rico en anticuerpos y nutrientes esenciales  Compartiendo la habitación   Puede estabilizar la respiración y la frecuencia cardíaca del recién nacido   Reduce el llanto   Mejorar la capacidad del recién nacido para mantener la temperatura y los niveles de azúcar en la neeraj   Estimulación temprana del sistema inmunitario del bebé   efectos calmantes   Enlace más fácil y rápido   El compartir la habitación promueve conocer a hancock recién nacido   El alojamiento conjunto facilita la lactancia materna   Las mujeres que se alojan con scott recién nacidos producen [place] y producen un buen suministro de [place]   Se hace más fácil reconocer las señales de alimentación del bebé   Los bebés tienen sesiones de lactancia más largas   Las mujeres que comparten habitación con scott recién nacidos tienen más probabilidades de amamantar exclusivamente en comparación con las mujeres que están separadas de scott recién nacidos    Piel con piel   El contacto piel con piel debe ocurrir independientemente de la preferencia de alimentación de fernando torin o el modo de [place]   Después del parto de hancock bebé, es importante que fernando madre cayden y hancock bebé tengan un contacto ininterrumpido de piel a piel   El contacto piel con piel debe ocurrir inmediatamente después del nacimiento barbara al menos fernando o dos horas y [place] después de la primera alimentación para las madres que [place]   El contacto piel con piel significa colocar recién nacidos secos y sin ropa sobre el pecho desnudo de hancock [place], con mantas calientes cubriendo la espalda del bebé   Todos los procedimientos de rutina, margaret las evaluaciones de [place] y recién nacidos, pueden realizarse barbara el contacto piel con piel o pueden retrasarse hasta después del período sensible inmediatamente después del nacimiento   Estamos orgullosos de ofrecer amplios servicios educativos y de apoyo para alentar a las madres a amamantar   Pauline servicio ofrece información, educación y [de-identified] para mujeres que inga amamantar  Las [place] pueden dejar un mensaje o fernando pregunta sobre la lactancia en línea en cualquier momento del día  Las enfermeras responderán dentro de las 72 horas   La línea de respuesta de lactancia materna es [phone] ([phone])  NO deje mensajes urgentes o emergentes en esta línea de mensaje  Comuníquese con hancock médico Parvez Loose si tiene alguna pregunta o inquietud urgente   En michael de sospecha de fernando afección médica de emergencia, [facility]      Attaching your baby at the Breast (English): [email or web address] org/portfolio-items/attaching-your-baby-[email or web address]-the-breast/?[email or web address]    Attaching your baby at the Breast (Syriac):  [email or web address] Healthmedia org/portfolio-items/[email or web address]      Coronavirus (COVID-19) pregnancy FAQs: Medical experts answer your questions  From [employer] com cy  [email or web address]  bc (courtesy of [facility])  As of 3/18/20  By Fara Giron 37  Medically reviewed by Society for Maternal-Fetal Medicine       We asked parents-to-be to send us their most pressing questions about coronavirus (COVID-19)  Among them: Is it still safe to deliver in a hospital? What if my ob-gyn has the virus? We sent those questions to the top docs at the Society for Maternal-Fetal Medicine  Here are their answers  The coronavirus (COVID-19) pandemic has been declared a national emergency in the [facility] by [employer]  Moms-to-be like you are concerned about everything from getting medicines to managing disruptions at work  But above and beyond any worry about lifestyle changes is a focus on your health and the impact of COVID-19 on your pregnancy  We asked obstetrics doctors who handle the most complicated pregnancy issues to answer your questions about the coronavirus  Here are their responses, provided by Dr Joyce Recio and her colleagues at the Society for Josephine 250  Am I at more risk for COVID-19 if I'm pregnant? We don't know  Pregnancy does change your immune system in ways that might make you more susceptible to viral respiratory infections like COVID-19  If you become infected, you might also be at higher risk for more severe illness compared to the general population  Although this does not appear to be the case with COVID-19, based on limited data so far, a higher risk has been true for pregnant women with other coronavirus infections (SARS-CoV and MERS-CoV) and other viral respiratory infections, such as flu  It's important to take preventive actions to avoid infection, such as washing your hands often and avoiding people who are sick  How might coronavirus affect my pregnancy? Again, we don't know   Women with other coronavirus infections (such as SARS-CoV) did not have miscarriage or stillbirth at higher rates than the general population  We know that having other respiratory viral infections during pregnancy, such as flu, has been associated with problems like low birth weight and  birth  Also, having a high fever early in pregnancy may increase the risk of certain birth defects  Could I transmit coronavirus to my baby during pregnancy or delivery? We don't know whether you could transmit COVID-19 to your baby before or during delivery  However, among the few case studies of infants born to mothers with [phone] published in peer-reviewed literature, none of the infants tested positive for the virus  Also, there was no virus detected in samples of amniotic fluid or breast milk  There have been a few reports of newborns as young as a few days old with infection, suggesting that a mother can transmit the infection to her infant through close contact after delivery  There have been no reports of mother-to-baby transmission for other coronaviruses (MERS-CoV and SARS-CoV), although only limited information is available  Is it safe for me to deliver at a hospital where there have been COVID-19 cases? Yes  We know that COVID-19 is a very scary virus  The good news is that hospitals are taking great precautions to keep patients and healthcare providers safe  When a patient is even suspected to have COVID-19, they are placed in a negative pressure room  (Think of these rooms as vacuums that suck and filter the air so it's safe for the other people in the hospital)  This makes it possible for you to deliver at the hospital without putting you or your baby at risk  Hospitals are also implementing stricter visiting policies to keep patients safe  It's worth calling your hospital to check if there are any new regulations to be aware of      What plans should I make now in case the hospital system is overwhelmed when it's time for me to deliver? This is a great question to talk with your doctor or midwife about when you're 34 to 36 weeks pregnant  Every hospital is making different plans for dealing with this scenario  I work in healthcare  Should I ask my doctor to excuse me from work until the baby is born? What if I work in a school, the travel VIOSO 20, or some other high-risk setting? Healthcare facilities should take care to limit the exposure of pregnant employees to patients with confirmed or suspected COVID-19, just as they would with other infectious cases  If you continue working, be sure to follow the CDC's risk assessment and infection control guidelines  If you work in a school, travel VIOSO 20, or other high-risk setting, talk with your employer about what it's doing to protect employees and minimize infection risks  Wash your hands often  What if my OB gets COVID-19? If your doctor or midwife tests positive for COVID-19, they will need to be quarantined until they recover and are no longer at risk of transmitting the virus  In this case, you'll be assigned to another OB in your doctor's practice (or you may choose another practitioner yourself)  Ask your new OB or your doctor's office if you should self-quarantine or be tested for the virus  (It will depend on when you last saw your provider and when that person tested positive )    Should we hold off on trying to conceive because of COVID-19? At this time, there's no reason to hold off on trying to get pregnant, but the data we have is really limited  For example, we don't think the virus causes birth defects or increases your risk of miscarriage  But we don't know whether you could transmit COVID-19 to your baby before or during delivery  We also don't know if the virus lives in semen or can be sexually transmitted  We have a babymoon scheduled in the next few months - should we cancel?   If you're planning to travel internationally or on a cruise, you should strongly consider canceling  At this time, the virus has reached more than 140 countries, and there are travel bans to [place], most of [place], and [place]  Places where large numbers of people gather are at highest risk, especially airports and cruise ships  If you're planning travel in the [place] note that any travel setting increases your risk of exposure, and there may be travel bans even in [place] by the time you're scheduled to go  Even if you're state is not blocked, you'll definitely want to avoid traveling to communities where the virus is spreading  To find out where these places are, check The [place] Times map based on CDC data  For the most current advice to help you avoid exposure, check the CDC's COVID-19 travel page  Will the hospital separate me from my  and keep the baby in quarantine? If you test positive for COVID-19 or have been exposed but have no symptoms, the CDC, [employer]st[employer]ians [employer]nec[employer], and the Society for [place] [employer] all recommend that you be  from your baby to decrease the risk of transmission to the baby  This would last until you are no longer at risk of transmitting the virus  If you do not have COVID-19 and have not been exposed to the virus, the hospital will not separate you from your   My hospital is restricting visitors and only allowing one support person  If my support person leaves after the delivery, will they be allowed to come back? Every hospital has different policies  Contact your hospital or labor and delivery unit a week or so before delivery to get the most up-to-date restrictions  In general, if your support person needs to leave, they would be allowed back unless they knew they were exposed to COVID-19 after leaving your company  [facility] understands that the coronavirus (COVID-19) pandemic is an evolving story and that your questions will change over time   We'll continue asking moms and dads in our Community what they want to know, and we'll get the answers from experts to keep them - and you - informed and supported  My mom was planning to fly here to help me care for my new baby after delivery  Should I tell her not to come? Yes  If your mom is over 61 or has any serious chronic medical conditions (such as heart disease, lung disease, or diabetes), she is at higher risk of serious illness from COVID-19 and should avoid air travel  And remember that any travel setting increases a person's risk of exposure  So, it may be risky to have her around the baby after she has been traveling  For the most current advice on traveling, check the [facility] COVID-19 travel page  [facility] understands that the coronavirus pandemic is an evolving story and that your questions will change over time  We'll continue asking moms and dads in our Community what they want to know, and we'll get the answers from experts to keep them - and you - informed and supported

## 2021-09-29 ENCOUNTER — PATIENT OUTREACH (OUTPATIENT)
Dept: OBGYN CLINIC | Facility: CLINIC | Age: 26
End: 2021-09-29

## 2021-09-29 ENCOUNTER — APPOINTMENT (OUTPATIENT)
Dept: LAB | Facility: HOSPITAL | Age: 26
End: 2021-09-29

## 2021-09-29 DIAGNOSIS — Z3A.29 29 WEEKS GESTATION OF PREGNANCY: ICD-10-CM

## 2021-09-29 LAB — GLUCOSE 1H P 50 G GLC PO SERPL-MCNC: 142 MG/DL (ref 40–134)

## 2021-09-29 PROCEDURE — 36415 COLL VENOUS BLD VENIPUNCTURE: CPT

## 2021-09-29 PROCEDURE — 82950 GLUCOSE TEST: CPT

## 2021-09-29 NOTE — PROGRESS NOTES
KAILASH ARENAS spoke with 31 y/o-S- G2:P1-  [place] speaking woman for pre vikas assessment  Pt resides with her mom ans 10 y/o son  Pt reported pregnancy was not intended but welcome  Pt states FOB is aware but not involved  FOB resides in [place]  Pt denies any usage of drug, alcohol or smoking  Denies any Mental health or domestic violence history  Pt is applying for MA and WIC  Pt denies transportation issues  Pt works as  and denies financial concerns  Pt claimed her main support is her mom  Denies any question or concerns at this time  KAILASH ARENAS explained her role and gave contact information  Pt will call KAILASH ARENAS at any time needed

## 2021-10-05 DIAGNOSIS — E74.39 GLUCOSE INTOLERANCE: Primary | ICD-10-CM

## 2021-10-05 PROBLEM — O99.810 GLUCOSE INTOLERANCE OF PREGNANCY: Status: ACTIVE | Noted: 2021-10-05

## 2021-10-05 PROBLEM — O99.210 OBESITY IN PREGNANCY: Status: RESOLVED | Noted: 2021-09-21 | Resolved: 2021-10-05

## 2021-10-05 PROBLEM — Z34.92 PRENATAL CARE, SECOND TRIMESTER: Status: RESOLVED | Noted: 2021-09-21 | Resolved: 2021-10-05

## 2021-10-06 ENCOUNTER — ROUTINE PRENATAL (OUTPATIENT)
Dept: OBGYN CLINIC | Facility: CLINIC | Age: 26
End: 2021-10-06

## 2021-10-06 VITALS
HEART RATE: 97 BPM | HEIGHT: 65 IN | SYSTOLIC BLOOD PRESSURE: 113 MMHG | DIASTOLIC BLOOD PRESSURE: 67 MMHG | WEIGHT: 222 LBS | BODY MASS INDEX: 36.99 KG/M2

## 2021-10-06 DIAGNOSIS — Z3A.30 30 WEEKS GESTATION OF PREGNANCY: Primary | ICD-10-CM

## 2021-10-06 DIAGNOSIS — Z98.891 HISTORY OF CESAREAN DELIVERY: ICD-10-CM

## 2021-10-06 DIAGNOSIS — O99.810 GLUCOSE INTOLERANCE OF PREGNANCY: ICD-10-CM

## 2021-10-06 DIAGNOSIS — O99.213 MATERNAL OBESITY, ANTEPARTUM, THIRD TRIMESTER: ICD-10-CM

## 2021-10-06 DIAGNOSIS — O09.30 LATE PRENATAL CARE: ICD-10-CM

## 2021-10-06 DIAGNOSIS — Z30.09 GENERAL COUNSELING AND ADVICE ON FEMALE CONTRACEPTION: ICD-10-CM

## 2021-10-06 PROCEDURE — PNV: Performed by: OBSTETRICS & GYNECOLOGY

## 2021-10-07 ENCOUNTER — APPOINTMENT (OUTPATIENT)
Dept: LAB | Facility: HOSPITAL | Age: 26
End: 2021-10-07
Payer: COMMERCIAL

## 2021-10-07 DIAGNOSIS — E74.39 GLUCOSE INTOLERANCE: ICD-10-CM

## 2021-10-07 LAB
GLUCOSE 1H P 100 G GLC PO SERPL-MCNC: 167 MG/DL (ref 65–179)
GLUCOSE 2H P 100 G GLC PO SERPL-MCNC: 134 MG/DL (ref 65–154)
GLUCOSE 3H P 100 G GLC PO SERPL-MCNC: 88 MG/DL (ref 65–139)
GLUCOSE P FAST SERPL-MCNC: 84 MG/DL (ref 65–99)

## 2021-10-07 PROCEDURE — 36415 COLL VENOUS BLD VENIPUNCTURE: CPT

## 2021-10-07 PROCEDURE — 82951 GLUCOSE TOLERANCE TEST (GTT): CPT

## 2021-10-07 PROCEDURE — 82952 GTT-ADDED SAMPLES: CPT

## 2021-10-11 ENCOUNTER — TELEPHONE (OUTPATIENT)
Dept: OBGYN CLINIC | Facility: CLINIC | Age: 26
End: 2021-10-11

## 2021-10-19 ENCOUNTER — ROUTINE PRENATAL (OUTPATIENT)
Dept: OBGYN CLINIC | Facility: CLINIC | Age: 26
End: 2021-10-19

## 2021-10-19 VITALS
BODY MASS INDEX: 37.99 KG/M2 | SYSTOLIC BLOOD PRESSURE: 127 MMHG | DIASTOLIC BLOOD PRESSURE: 60 MMHG | HEART RATE: 101 BPM | HEIGHT: 65 IN | WEIGHT: 228 LBS

## 2021-10-19 DIAGNOSIS — O99.810 GLUCOSE INTOLERANCE OF PREGNANCY: ICD-10-CM

## 2021-10-19 DIAGNOSIS — Z3A.32 32 WEEKS GESTATION OF PREGNANCY: ICD-10-CM

## 2021-10-19 DIAGNOSIS — Z98.891 HISTORY OF CESAREAN DELIVERY: ICD-10-CM

## 2021-10-19 DIAGNOSIS — O99.213 MATERNAL OBESITY, ANTEPARTUM, THIRD TRIMESTER: ICD-10-CM

## 2021-10-19 DIAGNOSIS — Z34.83 SUPERVISION OF NORMAL INTRAUTERINE PREGNANCY IN MULTIGRAVIDA, THIRD TRIMESTER: Primary | ICD-10-CM

## 2021-10-19 PROCEDURE — 99213 OFFICE O/P EST LOW 20 MIN: CPT | Performed by: OBSTETRICS & GYNECOLOGY

## 2021-10-19 RX ORDER — FERROUS SULFATE 325(65) MG
325 TABLET ORAL
COMMUNITY

## 2021-10-26 ENCOUNTER — ULTRASOUND (OUTPATIENT)
Dept: PERINATAL CARE | Facility: OTHER | Age: 26
End: 2021-10-26
Payer: COMMERCIAL

## 2021-10-26 VITALS
BODY MASS INDEX: 38.39 KG/M2 | HEIGHT: 65 IN | HEART RATE: 110 BPM | WEIGHT: 230.4 LBS | DIASTOLIC BLOOD PRESSURE: 80 MMHG | SYSTOLIC BLOOD PRESSURE: 125 MMHG

## 2021-10-26 DIAGNOSIS — Z3A.33 33 WEEKS GESTATION OF PREGNANCY: Primary | ICD-10-CM

## 2021-10-26 DIAGNOSIS — O99.213 MATERNAL OBESITY, ANTEPARTUM, THIRD TRIMESTER: ICD-10-CM

## 2021-10-26 DIAGNOSIS — O09.30 LATE PRENATAL CARE: ICD-10-CM

## 2021-10-26 PROCEDURE — 76816 OB US FOLLOW-UP PER FETUS: CPT | Performed by: OBSTETRICS & GYNECOLOGY

## 2021-10-26 PROCEDURE — 99212 OFFICE O/P EST SF 10 MIN: CPT | Performed by: OBSTETRICS & GYNECOLOGY

## 2021-11-01 PROBLEM — Z3A.33 33 WEEKS GESTATION OF PREGNANCY: Status: ACTIVE | Noted: 2021-09-21

## 2021-11-01 PROBLEM — Z3A.34 34 WEEKS GESTATION OF PREGNANCY: Status: ACTIVE | Noted: 2021-09-21

## 2021-11-01 PROBLEM — O99.810 GLUCOSE INTOLERANCE OF PREGNANCY: Status: RESOLVED | Noted: 2021-10-05 | Resolved: 2021-11-01

## 2021-11-02 ENCOUNTER — ROUTINE PRENATAL (OUTPATIENT)
Dept: OBGYN CLINIC | Facility: CLINIC | Age: 26
End: 2021-11-02

## 2021-11-02 VITALS
WEIGHT: 230 LBS | SYSTOLIC BLOOD PRESSURE: 114 MMHG | DIASTOLIC BLOOD PRESSURE: 66 MMHG | HEART RATE: 97 BPM | BODY MASS INDEX: 38.32 KG/M2 | HEIGHT: 65 IN

## 2021-11-02 DIAGNOSIS — Z3A.34 34 WEEKS GESTATION OF PREGNANCY: ICD-10-CM

## 2021-11-02 DIAGNOSIS — Z30.09 GENERAL COUNSELING AND ADVICE ON FEMALE CONTRACEPTION: ICD-10-CM

## 2021-11-02 DIAGNOSIS — O09.93 SUPERVISION OF HIGH-RISK PREGNANCY, THIRD TRIMESTER: Primary | ICD-10-CM

## 2021-11-02 DIAGNOSIS — Z98.891 HISTORY OF CESAREAN DELIVERY: ICD-10-CM

## 2021-11-02 DIAGNOSIS — O99.213 MATERNAL OBESITY, ANTEPARTUM, THIRD TRIMESTER: ICD-10-CM

## 2021-11-02 PROCEDURE — 99213 OFFICE O/P EST LOW 20 MIN: CPT | Performed by: OBSTETRICS & GYNECOLOGY

## 2021-11-14 PROBLEM — Z3A.36 36 WEEKS GESTATION OF PREGNANCY: Status: ACTIVE | Noted: 2021-09-21

## 2021-11-16 ENCOUNTER — ROUTINE PRENATAL (OUTPATIENT)
Dept: OBGYN CLINIC | Facility: CLINIC | Age: 26
End: 2021-11-16

## 2021-11-16 VITALS
DIASTOLIC BLOOD PRESSURE: 60 MMHG | BODY MASS INDEX: 38.82 KG/M2 | HEIGHT: 65 IN | WEIGHT: 233 LBS | SYSTOLIC BLOOD PRESSURE: 121 MMHG | HEART RATE: 100 BPM

## 2021-11-16 DIAGNOSIS — Z34.83 SUPERVISION OF NORMAL INTRAUTERINE PREGNANCY IN MULTIGRAVIDA IN THIRD TRIMESTER: ICD-10-CM

## 2021-11-16 DIAGNOSIS — Z30.09 GENERAL COUNSELING AND ADVICE ON FEMALE CONTRACEPTION: ICD-10-CM

## 2021-11-16 DIAGNOSIS — Z3A.36 36 WEEKS GESTATION OF PREGNANCY: Primary | ICD-10-CM

## 2021-11-16 DIAGNOSIS — O09.30 LATE PRENATAL CARE: ICD-10-CM

## 2021-11-16 DIAGNOSIS — Z98.891 HISTORY OF CESAREAN DELIVERY: ICD-10-CM

## 2021-11-16 DIAGNOSIS — O99.213 MATERNAL OBESITY, ANTEPARTUM, THIRD TRIMESTER: ICD-10-CM

## 2021-11-16 PROCEDURE — 87150 DNA/RNA AMPLIFIED PROBE: CPT

## 2021-11-16 PROCEDURE — 99214 OFFICE O/P EST MOD 30 MIN: CPT | Performed by: OBSTETRICS & GYNECOLOGY

## 2021-11-18 LAB — GP B STREP DNA SPEC QL NAA+PROBE: POSITIVE

## 2021-11-19 PROBLEM — B95.1 POSITIVE GBS TEST: Status: ACTIVE | Noted: 2021-11-19

## 2021-11-22 PROBLEM — O99.013 ANEMIA DURING PREGNANCY IN THIRD TRIMESTER: Status: ACTIVE | Noted: 2021-11-22

## 2021-11-22 PROBLEM — Z3A.37 37 WEEKS GESTATION OF PREGNANCY: Status: ACTIVE | Noted: 2021-09-21

## 2021-11-23 ENCOUNTER — ROUTINE PRENATAL (OUTPATIENT)
Dept: OBGYN CLINIC | Facility: CLINIC | Age: 26
End: 2021-11-23

## 2021-11-23 VITALS
HEIGHT: 65 IN | BODY MASS INDEX: 38.99 KG/M2 | HEART RATE: 88 BPM | WEIGHT: 234 LBS | DIASTOLIC BLOOD PRESSURE: 88 MMHG | SYSTOLIC BLOOD PRESSURE: 121 MMHG

## 2021-11-23 DIAGNOSIS — Z3A.37 37 WEEKS GESTATION OF PREGNANCY: ICD-10-CM

## 2021-11-23 DIAGNOSIS — O99.013 ANEMIA DURING PREGNANCY IN THIRD TRIMESTER: ICD-10-CM

## 2021-11-23 DIAGNOSIS — O09.30 LATE PRENATAL CARE: ICD-10-CM

## 2021-11-23 DIAGNOSIS — R35.0 URINARY FREQUENCY: Primary | ICD-10-CM

## 2021-11-23 DIAGNOSIS — B95.1 POSITIVE GBS TEST: ICD-10-CM

## 2021-11-23 DIAGNOSIS — Z98.891 HISTORY OF CESAREAN DELIVERY: ICD-10-CM

## 2021-11-23 DIAGNOSIS — O99.213 MATERNAL OBESITY, ANTEPARTUM, THIRD TRIMESTER: ICD-10-CM

## 2021-11-23 DIAGNOSIS — Z34.90 ENCOUNTER FOR PRENATAL CARE: ICD-10-CM

## 2021-11-23 LAB
SL AMB  POCT GLUCOSE, UA: NORMAL
SL AMB LEUKOCYTE ESTERASE,UA: NORMAL
SL AMB POCT BILIRUBIN,UA: NORMAL
SL AMB POCT BLOOD,UA: NORMAL
SL AMB POCT CLARITY,UA: CLEAR
SL AMB POCT COLOR,UA: YELLOW
SL AMB POCT KETONES,UA: NORMAL
SL AMB POCT NITRITE,UA: NORMAL
SL AMB POCT PH,UA: 7
SL AMB POCT SPECIFIC GRAVITY,UA: 1.01
SL AMB POCT URINE PROTEIN: NORMAL
SL AMB POCT UROBILINOGEN: 0.2

## 2021-11-23 PROCEDURE — 90682 RIV4 VACC RECOMBINANT DNA IM: CPT | Performed by: OBSTETRICS & GYNECOLOGY

## 2021-11-23 PROCEDURE — 90471 IMMUNIZATION ADMIN: CPT | Performed by: OBSTETRICS & GYNECOLOGY

## 2021-11-23 PROCEDURE — 81002 URINALYSIS NONAUTO W/O SCOPE: CPT | Performed by: OBSTETRICS & GYNECOLOGY

## 2021-11-23 PROCEDURE — 99214 OFFICE O/P EST MOD 30 MIN: CPT | Performed by: OBSTETRICS & GYNECOLOGY

## 2021-11-30 ENCOUNTER — ROUTINE PRENATAL (OUTPATIENT)
Dept: OBGYN CLINIC | Facility: CLINIC | Age: 26
End: 2021-11-30

## 2021-11-30 VITALS
HEART RATE: 97 BPM | BODY MASS INDEX: 39.32 KG/M2 | WEIGHT: 236 LBS | HEIGHT: 65 IN | DIASTOLIC BLOOD PRESSURE: 72 MMHG | SYSTOLIC BLOOD PRESSURE: 108 MMHG

## 2021-11-30 DIAGNOSIS — O99.013 ANEMIA DURING PREGNANCY IN THIRD TRIMESTER: ICD-10-CM

## 2021-11-30 DIAGNOSIS — B95.1 POSITIVE GBS TEST: ICD-10-CM

## 2021-11-30 DIAGNOSIS — O09.30 LATE PRENATAL CARE: ICD-10-CM

## 2021-11-30 DIAGNOSIS — Z98.891 HISTORY OF CESAREAN DELIVERY: ICD-10-CM

## 2021-11-30 DIAGNOSIS — Z3A.37 37 WEEKS GESTATION OF PREGNANCY: Primary | ICD-10-CM

## 2021-11-30 DIAGNOSIS — O99.213 MATERNAL OBESITY, ANTEPARTUM, THIRD TRIMESTER: ICD-10-CM

## 2021-11-30 PROCEDURE — 99214 OFFICE O/P EST MOD 30 MIN: CPT | Performed by: OBSTETRICS & GYNECOLOGY

## 2021-12-06 PROBLEM — Z34.93 PRENATAL CARE, THIRD TRIMESTER: Status: ACTIVE | Noted: 2021-12-06

## 2021-12-06 PROBLEM — B95.1 POSITIVE GBS TEST: Status: ACTIVE | Noted: 2021-12-06

## 2021-12-06 PROBLEM — R73.09 GTT ABNORMAL: Status: ACTIVE | Noted: 2021-12-06

## 2021-12-06 PROBLEM — Z3A.39 39 WEEKS GESTATION OF PREGNANCY: Status: ACTIVE | Noted: 2021-09-21

## 2021-12-07 ENCOUNTER — ROUTINE PRENATAL (OUTPATIENT)
Dept: OBGYN CLINIC | Facility: CLINIC | Age: 26
End: 2021-12-07

## 2021-12-07 ENCOUNTER — HOSPITAL ENCOUNTER (OUTPATIENT)
Facility: HOSPITAL | Age: 26
Discharge: HOME | End: 2021-12-07
Attending: OBSTETRICS & GYNECOLOGY | Admitting: OBSTETRICS & GYNECOLOGY
Payer: COMMERCIAL

## 2021-12-07 VITALS
WEIGHT: 237.2 LBS | HEIGHT: 65 IN | BODY MASS INDEX: 39.52 KG/M2 | DIASTOLIC BLOOD PRESSURE: 66 MMHG | HEART RATE: 97 BPM | SYSTOLIC BLOOD PRESSURE: 143 MMHG

## 2021-12-07 VITALS
BODY MASS INDEX: 39.47 KG/M2 | HEIGHT: 65 IN | HEART RATE: 90 BPM | SYSTOLIC BLOOD PRESSURE: 116 MMHG | RESPIRATION RATE: 18 BRPM | TEMPERATURE: 98.5 F | DIASTOLIC BLOOD PRESSURE: 70 MMHG

## 2021-12-07 DIAGNOSIS — R03.0 ELEVATED BLOOD PRESSURE READING IN OFFICE WITHOUT DIAGNOSIS OF HYPERTENSION: ICD-10-CM

## 2021-12-07 DIAGNOSIS — O99.213 MATERNAL OBESITY, ANTEPARTUM, THIRD TRIMESTER: ICD-10-CM

## 2021-12-07 DIAGNOSIS — Z30.09 GENERAL COUNSELING AND ADVICE ON FEMALE CONTRACEPTION: ICD-10-CM

## 2021-12-07 DIAGNOSIS — Z98.891 HISTORY OF CESAREAN DELIVERY: ICD-10-CM

## 2021-12-07 DIAGNOSIS — O09.30 LATE PRENATAL CARE: ICD-10-CM

## 2021-12-07 DIAGNOSIS — R73.09 GTT ABNORMAL: ICD-10-CM

## 2021-12-07 DIAGNOSIS — O99.013 ANEMIA DURING PREGNANCY IN THIRD TRIMESTER: ICD-10-CM

## 2021-12-07 DIAGNOSIS — B95.1 POSITIVE GBS TEST: ICD-10-CM

## 2021-12-07 DIAGNOSIS — Z34.93 PRENATAL CARE, THIRD TRIMESTER: Primary | ICD-10-CM

## 2021-12-07 DIAGNOSIS — Z3A.39 39 WEEKS GESTATION OF PREGNANCY: ICD-10-CM

## 2021-12-07 LAB
ALBUMIN SERPL BCP-MCNC: 2.7 G/DL (ref 3.5–5)
ALP SERPL-CCNC: 131 U/L (ref 46–116)
ALT SERPL W P-5'-P-CCNC: 19 U/L (ref 12–78)
ANION GAP SERPL CALCULATED.3IONS-SCNC: 13 MMOL/L (ref 4–13)
AST SERPL W P-5'-P-CCNC: 15 U/L (ref 5–45)
BILIRUB SERPL-MCNC: 0.21 MG/DL (ref 0.2–1)
BUN SERPL-MCNC: 6 MG/DL (ref 5–25)
CALCIUM ALBUM COR SERPL-MCNC: 9.4 MG/DL (ref 8.3–10.1)
CALCIUM SERPL-MCNC: 8.4 MG/DL (ref 8.3–10.1)
CHLORIDE SERPL-SCNC: 100 MMOL/L (ref 100–108)
CO2 SERPL-SCNC: 21 MMOL/L (ref 21–32)
CREAT SERPL-MCNC: 0.63 MG/DL (ref 0.6–1.3)
CREAT UR-MCNC: 126 MG/DL
ERYTHROCYTE [DISTWIDTH] IN BLOOD BY AUTOMATED COUNT: 15.2 % (ref 11.6–15.1)
GFR SERPL CREATININE-BSD FRML MDRD: 123 ML/MIN/1.73SQ M
GLUCOSE SERPL-MCNC: 84 MG/DL (ref 65–140)
HCT VFR BLD AUTO: 29.2 % (ref 34.8–46.1)
HGB BLD-MCNC: 9.1 G/DL (ref 11.5–15.4)
MCH RBC QN AUTO: 26.8 PG (ref 26.8–34.3)
MCHC RBC AUTO-ENTMCNC: 31.2 G/DL (ref 31.4–37.4)
MCV RBC AUTO: 86 FL (ref 82–98)
PLATELET # BLD AUTO: 193 THOUSANDS/UL (ref 149–390)
POTASSIUM SERPL-SCNC: 3.9 MMOL/L (ref 3.5–5.3)
PROT SERPL-MCNC: 6.9 G/DL (ref 6.4–8.2)
PROT UR-MCNC: 27 MG/DL
PROT/CREAT UR: 0.21 MG/G{CREAT} (ref 0–0.1)
RBC # BLD AUTO: 3.4 MILLION/UL (ref 3.81–5.12)
SODIUM SERPL-SCNC: 134 MMOL/L (ref 136–145)
WBC # BLD AUTO: 9.37 THOUSAND/UL (ref 4.31–10.16)

## 2021-12-07 PROCEDURE — 85027 COMPLETE CBC AUTOMATED: CPT

## 2021-12-07 PROCEDURE — 80053 COMPREHEN METABOLIC PANEL: CPT

## 2021-12-07 PROCEDURE — NC001 PR NO CHARGE: Performed by: OBSTETRICS & GYNECOLOGY

## 2021-12-07 PROCEDURE — 84156 ASSAY OF PROTEIN URINE: CPT

## 2021-12-07 PROCEDURE — 99213 OFFICE O/P EST LOW 20 MIN: CPT

## 2021-12-07 PROCEDURE — 99214 OFFICE O/P EST MOD 30 MIN: CPT | Performed by: OBSTETRICS & GYNECOLOGY

## 2021-12-07 PROCEDURE — 82570 ASSAY OF URINE CREATININE: CPT

## 2021-12-07 NOTE — DISCHARGE INSTRUCTIONS
Please follow up with your OB/GYN provider for continued evaluation and monitoring of your symptoms and pregnancy course  Please return to the hospital for continued evaluation if you experience worsening of symptoms that include but are not limited to: headaches with elevated blood pressure readings, changes in vision, fevers, chills, increased abdominal pain, contractions, vaginal bleeding, or large discharge of fluid from your vagina

## 2021-12-07 NOTE — PROGRESS NOTES
L&D Triage Note - OB/GYN  Edith Varela 30 y o  female MRN: [id number]  Unit/Bed#:  TRIAGE 4 Encounter: [id number]        Patient is seen by [facility]    ASSESSMENT/PLAN  Edith Varela is a 30 y o   at 39w4d who presents to the L&D triage area for evaluation of elevated BP (143/66) with no additional symptoms of pre-eclampsia  1) Rule out of Pre-eclampisa in the setting of an elevated BP reading  - BP in the office was measured at 14  - Measurement at time of interview was 109/70  Repeated BP readings ranged from the 100s -110s / 70s  - Lab evaluation via CBC, CMP, and Urine Protein/Cr was unremarkable  - Patient deemed stable for discharge home  Return precautions were discussed at the bedside and patient expressed understanding  2)  Discharge instructions  - Patient instructed to call if experiencing worsening contractions, vaginal bleeding, loss of fluid or decreased fetal movement  - Will follow up with OBGYN in office    D/w Dr Mirlande Feliciano  ______________    SUBJECTIVE    JANNET: Estimated Date of Delivery: 12/10/21    HPI:  30 y o   39w4d presents with complaint of elevated BP readings while in the office  She was sent in to the L&D floor for evaluation and rule out of pre-eclampsia  She denies any headaches, vision changes, nausea, vomiting, abdominal pain, dizziness, LOC, new rashes, itchiness, changes in urination, changes in bowel movements, changes in baseline lower extremity swelling, numbness, or paraesthesias  Contractions: none  Leakage of fluid: none  Vaginal Bleeding: none  Fetal movement: present    Her obstetrical history is significant for positive GBS screen (36w4d) and US findings concerning for possible macrosomia (EFW in the 79th percentile at 33 weeks)  Review of Systems   Constitutional: Negative for activity change, appetite change, chills and fever  HENT: Negative for congestion, ear pain and sore throat      Eyes: Negative for pain, discharge and visual disturbance  Respiratory: Negative for apnea, cough and shortness of breath  Cardiovascular: Negative for chest pain and palpitations  Gastrointestinal: Negative for abdominal distention, abdominal pain and vomiting  Genitourinary: Negative for difficulty urinating, dysuria, flank pain, hematuria, vaginal bleeding, vaginal discharge and vaginal pain  Musculoskeletal: Negative for arthralgias, back pain, joint swelling, myalgias and neck pain  Skin: Negative for color change and rash  Neurological: Negative for dizziness, seizures, syncope, facial asymmetry and headaches  Psychiatric/Behavioral: Negative for agitation  All other systems reviewed and are negative  Physical Exam  Vitals and nursing note reviewed  Constitutional:       General: She is not in acute distress  Appearance: Normal appearance  She is well-developed  HENT:      Head: Normocephalic and atraumatic  Right Ear: External ear normal       Left Ear: External ear normal       Nose: Nose normal       Mouth/Throat:      Mouth: Mucous membranes are moist    Eyes:      Extraocular Movements: Extraocular movements intact  Conjunctiva/sclera: Conjunctivae normal    Cardiovascular:      Rate and Rhythm: Normal rate and regular rhythm  Pulses: Normal pulses  Heart sounds: Normal heart sounds  No murmur heard  Pulmonary:      Effort: Pulmonary effort is normal  No respiratory distress  Breath sounds: Normal breath sounds  Abdominal:      Palpations: Abdomen is soft  Tenderness: There is no abdominal tenderness  Musculoskeletal:         General: No swelling, tenderness, deformity or signs of injury  Cervical back: Neck supple  Skin:     General: Skin is warm and dry  Capillary Refill: Capillary refill takes less than 2 seconds  Neurological:      General: No focal deficit present        Mental Status: She is alert and oriented to person, place, and time  Psychiatric:         Mood and Affect: Mood normal        OBJECTIVE:  /70   Pulse 90   Temp 98 5 °F (36 9 °C)   Resp 18   Ht 5' 5" (1 651 m)   LMP 05/28/2021   BMI 39 47 kg/m²   Body mass index is 39 47 kg/m²  Labs:   Recent Results (from the past 24 hour(s))   CBC and Platelet    Collection Time: 12/07/21  2:49 PM   Result Value Ref Range    WBC 9 37 4 31 - 10 16 Thousand/uL    RBC 3 40 (L) 3 81 - 5 12 Million/uL    Hemoglobin 9 1 (L) 11 5 - 15 4 g/dL    Hematocrit 29 2 (L) 34 8 - 46 1 %    MCV 86 82 - 98 fL    MCH 26 8 26 8 - 34 3 pg    MCHC 31 2 (L) 31 4 - 37 4 g/dL    RDW 15 2 (H) 11 6 - 15 1 %    Platelets 929 234 - 330 Thousands/uL   Comprehensive metabolic panel    Collection Time: 12/07/21  2:49 PM   Result Value Ref Range    Sodium 134 (L) 136 - 145 mmol/L    Potassium 3 9 3 5 - 5 3 mmol/L    Chloride 100 100 - 108 mmol/L    CO2 21 21 - 32 mmol/L    ANION GAP 13 4 - 13 mmol/L    BUN 6 5 - 25 mg/dL    Creatinine 0 63 0 60 - 1 30 mg/dL    Glucose 84 65 - 140 mg/dL    Calcium 8 4 8 3 - 10 1 mg/dL    Corrected Calcium 9 4 8 3 - 10 1 mg/dL    AST 15 5 - 45 U/L    ALT 19 12 - 78 U/L    Alkaline Phosphatase 131 (H) 46 - 116 U/L    Total Protein 6 9 6 4 - 8 2 g/dL    Albumin 2 7 (L) 3 5 - 5 0 g/dL    Total Bilirubin 0 21 0 20 - 1 00 mg/dL    eGFR 123 ml/min/1 73sq m   Protein / creatinine ratio, urine    Collection Time: 12/07/21  2:50 PM   Result Value Ref Range    Creatinine, Ur 126 0 mg/dL    Protein Urine Random 27 mg/dL    Prot/Creat Ratio, Ur 0 21 (H) 0 00 - 0 10           Augie Quiroga MD  OB/GYN PGY-1  12/7/2021  5:39 PM      Portions of the record may have been created with voice recognition software  Occasional wrong word or "sound a like" substitutions may have occurred due to the inherent limitations of voice recognition software    Read the chart carefully and recognize, using context, where substitutions have occurred

## 2021-12-13 ENCOUNTER — ANESTHESIA EVENT (INPATIENT)
Dept: LABOR AND DELIVERY | Facility: HOSPITAL | Age: 26
DRG: 540 | End: 2021-12-13
Payer: COMMERCIAL

## 2021-12-13 ENCOUNTER — HOSPITAL ENCOUNTER (INPATIENT)
Facility: HOSPITAL | Age: 26
LOS: 3 days | Discharge: HOME | DRG: 540 | End: 2021-12-16
Attending: OBSTETRICS & GYNECOLOGY | Admitting: OBSTETRICS & GYNECOLOGY
Payer: COMMERCIAL

## 2021-12-13 DIAGNOSIS — Z3A.39 39 WEEKS GESTATION OF PREGNANCY: Primary | ICD-10-CM

## 2021-12-13 DIAGNOSIS — O34.219 PREVIOUS CESAREAN SECTION COMPLICATING PREGNANCY: ICD-10-CM

## 2021-12-13 DIAGNOSIS — Z98.891 HISTORY OF CESAREAN DELIVERY: ICD-10-CM

## 2021-12-13 PROBLEM — O34.211 ENCOUNTER FOR MATERNAL CARE FOR LOW TRANSVERSE SCAR FROM REPEAT CESAREAN DELIVERY: Status: ACTIVE | Noted: 2021-09-21

## 2021-12-13 LAB
ABO GROUP BLD: NORMAL
ALBUMIN SERPL BCP-MCNC: 2.7 G/DL (ref 3.5–5)
ALP SERPL-CCNC: 132 U/L (ref 46–116)
ALT SERPL W P-5'-P-CCNC: 16 U/L (ref 12–78)
ANION GAP SERPL CALCULATED.3IONS-SCNC: 13 MMOL/L (ref 4–13)
AST SERPL W P-5'-P-CCNC: 18 U/L (ref 5–45)
BASE EXCESS BLDCOA CALC-SCNC: -4.7 MMOL/L (ref 3–11)
BASE EXCESS BLDCOV CALC-SCNC: -2.6 MMOL/L (ref 1–9)
BASOPHILS # BLD AUTO: 0.01 THOUSANDS/ΜL (ref 0–0.1)
BASOPHILS NFR BLD AUTO: 0 % (ref 0–1)
BILIRUB SERPL-MCNC: 0.19 MG/DL (ref 0.2–1)
BLD GP AB SCN SERPL QL: NEGATIVE
BUN SERPL-MCNC: 4 MG/DL (ref 5–25)
CALCIUM ALBUM COR SERPL-MCNC: 9.8 MG/DL (ref 8.3–10.1)
CALCIUM SERPL-MCNC: 8.8 MG/DL (ref 8.3–10.1)
CHLORIDE SERPL-SCNC: 103 MMOL/L (ref 100–108)
CO2 SERPL-SCNC: 20 MMOL/L (ref 21–32)
CREAT SERPL-MCNC: 0.56 MG/DL (ref 0.6–1.3)
CREAT UR-MCNC: 95 MG/DL
EOSINOPHIL # BLD AUTO: 0.01 THOUSAND/ΜL (ref 0–0.61)
EOSINOPHIL NFR BLD AUTO: 0 % (ref 0–6)
ERYTHROCYTE [DISTWIDTH] IN BLOOD BY AUTOMATED COUNT: 15.6 % (ref 11.6–15.1)
GFR SERPL CREATININE-BSD FRML MDRD: 128 ML/MIN/1.73SQ M
GLUCOSE SERPL-MCNC: 88 MG/DL (ref 65–140)
HCO3 BLDCOA-SCNC: 25.2 MMOL/L (ref 17.3–27.3)
HCO3 BLDCOV-SCNC: 24 MMOL/L (ref 12.2–28.6)
HCT VFR BLD AUTO: 29.8 % (ref 34.8–46.1)
HGB BLD-MCNC: 9.3 G/DL (ref 11.5–15.4)
IMM GRANULOCYTES # BLD AUTO: 0.08 THOUSAND/UL (ref 0–0.2)
IMM GRANULOCYTES NFR BLD AUTO: 1 % (ref 0–2)
LYMPHOCYTES # BLD AUTO: 1.81 THOUSANDS/ΜL (ref 0.6–4.47)
LYMPHOCYTES NFR BLD AUTO: 23 % (ref 14–44)
MCH RBC QN AUTO: 26.4 PG (ref 26.8–34.3)
MCHC RBC AUTO-ENTMCNC: 31.2 G/DL (ref 31.4–37.4)
MCV RBC AUTO: 85 FL (ref 82–98)
MONOCYTES # BLD AUTO: 0.38 THOUSAND/ΜL (ref 0.17–1.22)
MONOCYTES NFR BLD AUTO: 5 % (ref 4–12)
NEUTROPHILS # BLD AUTO: 5.49 THOUSANDS/ΜL (ref 1.85–7.62)
NEUTS SEG NFR BLD AUTO: 71 % (ref 43–75)
NRBC BLD AUTO-RTO: 0 /100 WBCS
O2 CT VFR BLDCOA CALC: 3.6 ML/DL
OXYHGB MFR BLDCOA: 21.4 %
OXYHGB MFR BLDCOV: 64 %
PCO2 BLDCOA: 73.1 MM[HG] (ref 30–60)
PCO2 BLDCOV: 48.1 MM HG (ref 27–43)
PH BLDCOA: 7.16 [PH] (ref 7.23–7.43)
PH BLDCOV: 7.32 [PH] (ref 7.19–7.49)
PLATELET # BLD AUTO: 195 THOUSANDS/UL (ref 149–390)
PMV BLD AUTO: 8.9 FL (ref 8.9–12.7)
PO2 BLDCOA: 15.4 MM HG (ref 5–25)
PO2 BLDCOV: 27.6 MM HG (ref 15–45)
POTASSIUM SERPL-SCNC: 3.8 MMOL/L (ref 3.5–5.3)
PROT SERPL-MCNC: 7 G/DL (ref 6.4–8.2)
PROT UR-MCNC: 24 MG/DL
PROT/CREAT UR: 0.25 MG/G{CREAT} (ref 0–0.1)
RBC # BLD AUTO: 3.52 MILLION/UL (ref 3.81–5.12)
RH BLD: POSITIVE
SAO2 % BLDCOV: 13.3 ML/DL
SODIUM SERPL-SCNC: 136 MMOL/L (ref 136–145)
SPECIMEN EXPIRATION DATE: NORMAL
WBC # BLD AUTO: 7.78 THOUSAND/UL (ref 4.31–10.16)

## 2021-12-13 PROCEDURE — 59514 CESAREAN DELIVERY ONLY: CPT | Performed by: OBSTETRICS & GYNECOLOGY

## 2021-12-13 PROCEDURE — 88307 TISSUE EXAM BY PATHOLOGIST: CPT | Performed by: PATHOLOGY

## 2021-12-13 PROCEDURE — 84156 ASSAY OF PROTEIN URINE: CPT | Performed by: OBSTETRICS & GYNECOLOGY

## 2021-12-13 PROCEDURE — 86900 BLOOD TYPING SEROLOGIC ABO: CPT | Performed by: OBSTETRICS & GYNECOLOGY

## 2021-12-13 PROCEDURE — NC001 PR NO CHARGE: Performed by: OBSTETRICS & GYNECOLOGY

## 2021-12-13 PROCEDURE — 86592 SYPHILIS TEST NON-TREP QUAL: CPT | Performed by: OBSTETRICS & GYNECOLOGY

## 2021-12-13 PROCEDURE — 86901 BLOOD TYPING SEROLOGIC RH(D): CPT | Performed by: OBSTETRICS & GYNECOLOGY

## 2021-12-13 PROCEDURE — 82570 ASSAY OF URINE CREATININE: CPT | Performed by: OBSTETRICS & GYNECOLOGY

## 2021-12-13 PROCEDURE — 82805 BLOOD GASES W/O2 SATURATION: CPT | Performed by: OBSTETRICS & GYNECOLOGY

## 2021-12-13 PROCEDURE — 80053 COMPREHEN METABOLIC PANEL: CPT | Performed by: OBSTETRICS & GYNECOLOGY

## 2021-12-13 PROCEDURE — 86850 RBC ANTIBODY SCREEN: CPT | Performed by: OBSTETRICS & GYNECOLOGY

## 2021-12-13 PROCEDURE — 4A1HXCZ MONITORING OF PRODUCTS OF CONCEPTION, CARDIAC RATE, EXTERNAL APPROACH: ICD-10-PCS | Performed by: OBSTETRICS & GYNECOLOGY

## 2021-12-13 PROCEDURE — 85025 COMPLETE CBC W/AUTO DIFF WBC: CPT | Performed by: OBSTETRICS & GYNECOLOGY

## 2021-12-13 RX ORDER — OXYTOCIN/RINGER'S LACTATE 30/500 ML
62.5 PLASTIC BAG, INJECTION (ML) INTRAVENOUS ONCE
Status: COMPLETED | OUTPATIENT
Start: 2021-12-13 | End: 2021-12-14

## 2021-12-13 RX ORDER — MORPHINE SULFATE 1 MG/ML
INJECTION, SOLUTION EPIDURAL; INTRATHECAL; INTRAVENOUS AS NEEDED
Status: DISCONTINUED | OUTPATIENT
Start: 2021-12-13 | End: 2021-12-13

## 2021-12-13 RX ORDER — NALOXONE HYDROCHLORIDE 0.4 MG/ML
0.1 INJECTION, SOLUTION INTRAMUSCULAR; INTRAVENOUS; SUBCUTANEOUS
Status: ACTIVE | OUTPATIENT
Start: 2021-12-13 | End: 2021-12-14

## 2021-12-13 RX ORDER — ACETAMINOPHEN 325 MG/1
650 TABLET ORAL EVERY 6 HOURS SCHEDULED
Status: DISCONTINUED | OUTPATIENT
Start: 2021-12-13 | End: 2021-12-14

## 2021-12-13 RX ORDER — LIDOCAINE HYDROCHLORIDE 10 MG/ML
INJECTION, SOLUTION EPIDURAL; INFILTRATION; INTRACAUDAL; PERINEURAL AS NEEDED
Status: DISCONTINUED | OUTPATIENT
Start: 2021-12-13 | End: 2021-12-13

## 2021-12-13 RX ORDER — HYDROMORPHONE HCL/PF 1 MG/ML
0.2 SYRINGE (ML) INJECTION
Status: DISCONTINUED | OUTPATIENT
Start: 2021-12-13 | End: 2021-12-16 | Stop reason: HOSPADM

## 2021-12-13 RX ORDER — EPHEDRINE SULFATE 50 MG/ML
INJECTION INTRAVENOUS AS NEEDED
Status: DISCONTINUED | OUTPATIENT
Start: 2021-12-13 | End: 2021-12-13

## 2021-12-13 RX ORDER — OXYTOCIN/RINGER'S LACTATE 30/500 ML
PLASTIC BAG, INJECTION (ML) INTRAVENOUS CONTINUOUS PRN
Status: DISCONTINUED | OUTPATIENT
Start: 2021-12-13 | End: 2021-12-13

## 2021-12-13 RX ORDER — DEXAMETHASONE SODIUM PHOSPHATE 4 MG/ML
8 INJECTION, SOLUTION INTRA-ARTICULAR; INTRALESIONAL; INTRAMUSCULAR; INTRAVENOUS; SOFT TISSUE ONCE AS NEEDED
Status: ACTIVE | OUTPATIENT
Start: 2021-12-13 | End: 2021-12-14

## 2021-12-13 RX ORDER — DIPHENHYDRAMINE HCL 25 MG
25 TABLET ORAL EVERY 6 HOURS PRN
Status: DISCONTINUED | OUTPATIENT
Start: 2021-12-13 | End: 2021-12-13

## 2021-12-13 RX ORDER — METOCLOPRAMIDE HYDROCHLORIDE 5 MG/ML
5 INJECTION INTRAMUSCULAR; INTRAVENOUS EVERY 6 HOURS PRN
Status: ACTIVE | OUTPATIENT
Start: 2021-12-13 | End: 2021-12-14

## 2021-12-13 RX ORDER — ONDANSETRON 2 MG/ML
4 INJECTION INTRAMUSCULAR; INTRAVENOUS ONCE AS NEEDED
Status: DISCONTINUED | OUTPATIENT
Start: 2021-12-13 | End: 2021-12-16 | Stop reason: HOSPADM

## 2021-12-13 RX ORDER — KETOROLAC TROMETHAMINE 30 MG/ML
30 INJECTION, SOLUTION INTRAMUSCULAR; INTRAVENOUS EVERY 6 HOURS
Status: COMPLETED | OUTPATIENT
Start: 2021-12-13 | End: 2021-12-14

## 2021-12-13 RX ORDER — ONDANSETRON 2 MG/ML
4 INJECTION INTRAMUSCULAR; INTRAVENOUS EVERY 8 HOURS PRN
Status: DISCONTINUED | OUTPATIENT
Start: 2021-12-13 | End: 2021-12-13

## 2021-12-13 RX ORDER — SIMETHICONE 80 MG
80 TABLET,CHEWABLE ORAL 4 TIMES DAILY PRN
Status: DISCONTINUED | OUTPATIENT
Start: 2021-12-13 | End: 2021-12-16 | Stop reason: HOSPADM

## 2021-12-13 RX ORDER — FENTANYL CITRATE 50 UG/ML
INJECTION, SOLUTION INTRAMUSCULAR; INTRAVENOUS AS NEEDED
Status: DISCONTINUED | OUTPATIENT
Start: 2021-12-13 | End: 2021-12-13

## 2021-12-13 RX ORDER — DIAPER,BRIEF,INFANT-TODD,DISP
1 EACH MISCELLANEOUS DAILY PRN
Status: DISCONTINUED | OUTPATIENT
Start: 2021-12-13 | End: 2021-12-16 | Stop reason: HOSPADM

## 2021-12-13 RX ORDER — HYDROMORPHONE HCL/PF 1 MG/ML
0.5 SYRINGE (ML) INJECTION EVERY 2 HOUR PRN
Status: DISCONTINUED | OUTPATIENT
Start: 2021-12-13 | End: 2021-12-16 | Stop reason: HOSPADM

## 2021-12-13 RX ORDER — KETOROLAC TROMETHAMINE 30 MG/ML
30 INJECTION, SOLUTION INTRAMUSCULAR; INTRAVENOUS EVERY 6 HOURS SCHEDULED
Status: DISCONTINUED | OUTPATIENT
Start: 2021-12-13 | End: 2021-12-13

## 2021-12-13 RX ORDER — SODIUM CHLORIDE, SODIUM LACTATE, POTASSIUM CHLORIDE, CALCIUM CHLORIDE 600; 310; 30; 20 MG/100ML; MG/100ML; MG/100ML; MG/100ML
125 INJECTION, SOLUTION INTRAVENOUS CONTINUOUS
Status: DISCONTINUED | OUTPATIENT
Start: 2021-12-13 | End: 2021-12-13

## 2021-12-13 RX ORDER — SODIUM CHLORIDE, SODIUM LACTATE, POTASSIUM CHLORIDE, CALCIUM CHLORIDE 600; 310; 30; 20 MG/100ML; MG/100ML; MG/100ML; MG/100ML
125 INJECTION, SOLUTION INTRAVENOUS CONTINUOUS
Status: DISCONTINUED | OUTPATIENT
Start: 2021-12-13 | End: 2021-12-16 | Stop reason: HOSPADM

## 2021-12-13 RX ORDER — DOCUSATE SODIUM 100 MG/1
100 CAPSULE, LIQUID FILLED ORAL 2 TIMES DAILY
Status: DISCONTINUED | OUTPATIENT
Start: 2021-12-13 | End: 2021-12-16 | Stop reason: HOSPADM

## 2021-12-13 RX ORDER — FENTANYL CITRATE/PF 50 MCG/ML
25 SYRINGE (ML) INJECTION
Status: DISCONTINUED | OUTPATIENT
Start: 2021-12-13 | End: 2021-12-16 | Stop reason: HOSPADM

## 2021-12-13 RX ORDER — CEFAZOLIN SODIUM 2 G/50ML
2000 SOLUTION INTRAVENOUS ONCE
Status: COMPLETED | OUTPATIENT
Start: 2021-12-13 | End: 2021-12-13

## 2021-12-13 RX ORDER — NALBUPHINE HCL 10 MG/ML
5 AMPUL (ML) INJECTION
Status: DISCONTINUED | OUTPATIENT
Start: 2021-12-13 | End: 2021-12-16 | Stop reason: HOSPADM

## 2021-12-13 RX ORDER — ACETAMINOPHEN 325 MG/1
650 TABLET ORAL EVERY 6 HOURS
Status: DISCONTINUED | OUTPATIENT
Start: 2021-12-13 | End: 2021-12-13

## 2021-12-13 RX ORDER — DIPHENHYDRAMINE HYDROCHLORIDE 50 MG/ML
25 INJECTION INTRAMUSCULAR; INTRAVENOUS EVERY 6 HOURS PRN
Status: ACTIVE | OUTPATIENT
Start: 2021-12-13 | End: 2021-12-14

## 2021-12-13 RX ORDER — IBUPROFEN 600 MG/1
600 TABLET ORAL EVERY 6 HOURS SCHEDULED
Status: DISCONTINUED | OUTPATIENT
Start: 2021-12-14 | End: 2021-12-16 | Stop reason: HOSPADM

## 2021-12-13 RX ORDER — CALCIUM CARBONATE 200(500)MG
1000 TABLET,CHEWABLE ORAL DAILY PRN
Status: DISCONTINUED | OUTPATIENT
Start: 2021-12-13 | End: 2021-12-16 | Stop reason: HOSPADM

## 2021-12-13 RX ORDER — KETOROLAC TROMETHAMINE 30 MG/ML
INJECTION, SOLUTION INTRAMUSCULAR; INTRAVENOUS AS NEEDED
Status: DISCONTINUED | OUTPATIENT
Start: 2021-12-13 | End: 2021-12-13

## 2021-12-13 RX ORDER — ONDANSETRON 2 MG/ML
INJECTION INTRAMUSCULAR; INTRAVENOUS AS NEEDED
Status: DISCONTINUED | OUTPATIENT
Start: 2021-12-13 | End: 2021-12-13

## 2021-12-13 RX ORDER — ONDANSETRON 2 MG/ML
4 INJECTION INTRAMUSCULAR; INTRAVENOUS EVERY 4 HOURS PRN
Status: ACTIVE | OUTPATIENT
Start: 2021-12-13 | End: 2021-12-14

## 2021-12-13 RX ORDER — DEXAMETHASONE SODIUM PHOSPHATE 4 MG/ML
INJECTION, SOLUTION INTRA-ARTICULAR; INTRALESIONAL; INTRAMUSCULAR; INTRAVENOUS; SOFT TISSUE AS NEEDED
Status: DISCONTINUED | OUTPATIENT
Start: 2021-12-13 | End: 2021-12-13

## 2021-12-13 RX ORDER — NITROGLYCERIN 5 MG/ML
INJECTION, SOLUTION INTRAVENOUS AS NEEDED
Status: DISCONTINUED | OUTPATIENT
Start: 2021-12-13 | End: 2021-12-13

## 2021-12-13 RX ADMIN — SODIUM CHLORIDE, SODIUM LACTATE, POTASSIUM CHLORIDE, AND CALCIUM CHLORIDE: .6; .31; .03; .02 INJECTION, SOLUTION INTRAVENOUS at 12:15

## 2021-12-13 RX ADMIN — NALBUPHINE HYDROCHLORIDE 5 MG: 10 INJECTION, SOLUTION INTRAMUSCULAR; INTRAVENOUS; SUBCUTANEOUS at 13:22

## 2021-12-13 RX ADMIN — LIDOCAINE HYDROCHLORIDE 3 ML: 10 INJECTION, SOLUTION EPIDURAL; INFILTRATION; INTRACAUDAL; PERINEURAL at 11:28

## 2021-12-13 RX ADMIN — KETOROLAC TROMETHAMINE 30 MG: 30 INJECTION, SOLUTION INTRAMUSCULAR at 12:40

## 2021-12-13 RX ADMIN — DOCUSATE SODIUM 100 MG: 100 CAPSULE ORAL at 18:18

## 2021-12-13 RX ADMIN — SODIUM CHLORIDE, SODIUM LACTATE, POTASSIUM CHLORIDE, AND CALCIUM CHLORIDE 125 ML/HR: .6; .31; .03; .02 INJECTION, SOLUTION INTRAVENOUS at 09:31

## 2021-12-13 RX ADMIN — FENTANYL CITRATE 15 MCG: 50 INJECTION, SOLUTION INTRAMUSCULAR; INTRAVENOUS at 11:32

## 2021-12-13 RX ADMIN — SODIUM CHLORIDE, SODIUM LACTATE, POTASSIUM CHLORIDE, AND CALCIUM CHLORIDE 125 ML/HR: .6; .31; .03; .02 INJECTION, SOLUTION INTRAVENOUS at 21:38

## 2021-12-13 RX ADMIN — ONDANSETRON 4 MG: 2 INJECTION INTRAMUSCULAR; INTRAVENOUS at 11:35

## 2021-12-13 RX ADMIN — SODIUM CHLORIDE, SODIUM LACTATE, POTASSIUM CHLORIDE, AND CALCIUM CHLORIDE 125 ML/HR: .6; .31; .03; .02 INJECTION, SOLUTION INTRAVENOUS at 13:37

## 2021-12-13 RX ADMIN — SODIUM CHLORIDE, SODIUM LACTATE, POTASSIUM CHLORIDE, AND CALCIUM CHLORIDE 999 ML/HR: .6; .31; .03; .02 INJECTION, SOLUTION INTRAVENOUS at 08:30

## 2021-12-13 RX ADMIN — NITROGLYCERIN 20 MCG: 5 INJECTION, SOLUTION INTRAVENOUS at 11:57

## 2021-12-13 RX ADMIN — KETOROLAC TROMETHAMINE 30 MG: 30 INJECTION, SOLUTION INTRAMUSCULAR at 18:18

## 2021-12-13 RX ADMIN — Medication 62.5 MILLI-UNITS/MIN: at 13:37

## 2021-12-13 RX ADMIN — MORPHINE SULFATE 0.15 MG: 1 INJECTION, SOLUTION EPIDURAL; INTRATHECAL; INTRAVENOUS at 11:32

## 2021-12-13 RX ADMIN — CEFAZOLIN SODIUM 2000 MG: 2 SOLUTION INTRAVENOUS at 11:19

## 2021-12-13 RX ADMIN — ACETAMINOPHEN 650 MG: 325 TABLET, FILM COATED ORAL at 16:45

## 2021-12-13 RX ADMIN — Medication 999 MILLI-UNITS/MIN: at 12:01

## 2021-12-13 RX ADMIN — NITROGLYCERIN 40 MCG: 5 INJECTION, SOLUTION INTRAVENOUS at 11:59

## 2021-12-13 RX ADMIN — DEXAMETHASONE SODIUM PHOSPHATE 4 MG: 4 INJECTION INTRA-ARTICULAR; INTRALESIONAL; INTRAMUSCULAR; INTRAVENOUS; SOFT TISSUE at 11:35

## 2021-12-13 RX ADMIN — EPHEDRINE SULFATE 10 MG: 50 INJECTION, SOLUTION INTRAVENOUS at 11:35

## 2021-12-13 RX ADMIN — PHENYLEPHRINE HYDROCHLORIDE 50 MCG/MIN: 10 INJECTION INTRAVENOUS at 11:35

## 2021-12-14 LAB
BASOPHILS # BLD AUTO: 0.01 THOUSANDS/ΜL (ref 0–0.1)
BASOPHILS NFR BLD AUTO: 0 % (ref 0–1)
DME PARACHUTE DELIVERY DATE ACTUAL: NORMAL
DME PARACHUTE DELIVERY DATE REQUESTED: NORMAL
DME PARACHUTE ITEM DESCRIPTION: NORMAL
DME PARACHUTE ORDER STATUS: NORMAL
DME PARACHUTE SUPPLIER NAME: NORMAL
DME PARACHUTE SUPPLIER PHONE: NORMAL
EOSINOPHIL # BLD AUTO: 0 THOUSAND/ΜL (ref 0–0.61)
EOSINOPHIL NFR BLD AUTO: 0 % (ref 0–6)
ERYTHROCYTE [DISTWIDTH] IN BLOOD BY AUTOMATED COUNT: 15.6 % (ref 11.6–15.1)
HCT VFR BLD AUTO: 22.9 % (ref 34.8–46.1)
HGB BLD-MCNC: 7.1 G/DL (ref 11.5–15.4)
IMM GRANULOCYTES # BLD AUTO: 0.11 THOUSAND/UL (ref 0–0.2)
IMM GRANULOCYTES NFR BLD AUTO: 1 % (ref 0–2)
LYMPHOCYTES # BLD AUTO: 1.99 THOUSANDS/ΜL (ref 0.6–4.47)
LYMPHOCYTES NFR BLD AUTO: 16 % (ref 14–44)
MCH RBC QN AUTO: 27.2 PG (ref 26.8–34.3)
MCHC RBC AUTO-ENTMCNC: 31 G/DL (ref 31.4–37.4)
MCV RBC AUTO: 88 FL (ref 82–98)
MONOCYTES # BLD AUTO: 0.86 THOUSAND/ΜL (ref 0.17–1.22)
MONOCYTES NFR BLD AUTO: 7 % (ref 4–12)
NEUTROPHILS # BLD AUTO: 9.32 THOUSANDS/ΜL (ref 1.85–7.62)
NEUTS SEG NFR BLD AUTO: 76 % (ref 43–75)
NRBC BLD AUTO-RTO: 0 /100 WBCS
PLATELET # BLD AUTO: 141 THOUSANDS/UL (ref 149–390)
PMV BLD AUTO: 9.3 FL (ref 8.9–12.7)
RBC # BLD AUTO: 2.61 MILLION/UL (ref 3.81–5.12)
RPR SER QL: NORMAL
WBC # BLD AUTO: 12.29 THOUSAND/UL (ref 4.31–10.16)

## 2021-12-14 PROCEDURE — 85025 COMPLETE CBC W/AUTO DIFF WBC: CPT | Performed by: OBSTETRICS & GYNECOLOGY

## 2021-12-14 PROCEDURE — 99024 POSTOP FOLLOW-UP VISIT: CPT | Performed by: OBSTETRICS & GYNECOLOGY

## 2021-12-14 RX ORDER — ACETAMINOPHEN 325 MG/1
650 TABLET ORAL EVERY 6 HOURS SCHEDULED
Status: DISCONTINUED | OUTPATIENT
Start: 2021-12-14 | End: 2021-12-16 | Stop reason: HOSPADM

## 2021-12-14 RX ADMIN — NALBUPHINE HYDROCHLORIDE 5 MG: 10 INJECTION, SOLUTION INTRAMUSCULAR; INTRAVENOUS; SUBCUTANEOUS at 06:00

## 2021-12-14 RX ADMIN — KETOROLAC TROMETHAMINE 30 MG: 30 INJECTION, SOLUTION INTRAMUSCULAR at 05:52

## 2021-12-14 RX ADMIN — SODIUM CHLORIDE, SODIUM LACTATE, POTASSIUM CHLORIDE, AND CALCIUM CHLORIDE 125 ML/HR: .6; .31; .03; .02 INJECTION, SOLUTION INTRAVENOUS at 05:47

## 2021-12-14 RX ADMIN — KETOROLAC TROMETHAMINE 30 MG: 30 INJECTION, SOLUTION INTRAMUSCULAR at 13:59

## 2021-12-14 RX ADMIN — DOCUSATE SODIUM 100 MG: 100 CAPSULE ORAL at 18:12

## 2021-12-14 RX ADMIN — ACETAMINOPHEN 650 MG: 325 TABLET, FILM COATED ORAL at 05:55

## 2021-12-14 RX ADMIN — KETOROLAC TROMETHAMINE 30 MG: 30 INJECTION, SOLUTION INTRAMUSCULAR at 00:17

## 2021-12-14 RX ADMIN — DOCUSATE SODIUM 100 MG: 100 CAPSULE ORAL at 10:03

## 2021-12-14 RX ADMIN — ACETAMINOPHEN 650 MG: 325 TABLET, FILM COATED ORAL at 14:01

## 2021-12-14 RX ADMIN — ACETAMINOPHEN 650 MG: 325 TABLET, FILM COATED ORAL at 00:16

## 2021-12-14 RX ADMIN — ACETAMINOPHEN 650 MG: 325 TABLET, FILM COATED ORAL at 19:53

## 2021-12-15 PROCEDURE — 99024 POSTOP FOLLOW-UP VISIT: CPT | Performed by: OBSTETRICS & GYNECOLOGY

## 2021-12-15 RX ADMIN — IBUPROFEN 600 MG: 600 TABLET, FILM COATED ORAL at 00:03

## 2021-12-15 RX ADMIN — ENOXAPARIN SODIUM 40 MG: 40 INJECTION SUBCUTANEOUS at 09:52

## 2021-12-15 RX ADMIN — ACETAMINOPHEN 650 MG: 325 TABLET, FILM COATED ORAL at 09:52

## 2021-12-15 RX ADMIN — IBUPROFEN 600 MG: 600 TABLET, FILM COATED ORAL at 17:22

## 2021-12-15 RX ADMIN — DOCUSATE SODIUM 100 MG: 100 CAPSULE ORAL at 17:23

## 2021-12-15 RX ADMIN — ACETAMINOPHEN 650 MG: 325 TABLET, FILM COATED ORAL at 01:42

## 2021-12-15 RX ADMIN — IBUPROFEN 600 MG: 600 TABLET, FILM COATED ORAL at 06:23

## 2021-12-15 RX ADMIN — ACETAMINOPHEN 650 MG: 325 TABLET, FILM COATED ORAL at 19:37

## 2021-12-15 RX ADMIN — DOCUSATE SODIUM 100 MG: 100 CAPSULE ORAL at 09:52

## 2021-12-16 VITALS
RESPIRATION RATE: 20 BRPM | BODY MASS INDEX: 39.49 KG/M2 | DIASTOLIC BLOOD PRESSURE: 68 MMHG | TEMPERATURE: 98.2 F | WEIGHT: 237 LBS | HEART RATE: 94 BPM | HEIGHT: 65 IN | OXYGEN SATURATION: 99 % | SYSTOLIC BLOOD PRESSURE: 111 MMHG

## 2021-12-16 PROCEDURE — 99024 POSTOP FOLLOW-UP VISIT: CPT | Performed by: OBSTETRICS & GYNECOLOGY

## 2021-12-16 RX ORDER — ACETAMINOPHEN 325 MG/1
650 TABLET ORAL EVERY 6 HOURS SCHEDULED
Refills: 0
Start: 2021-12-16

## 2021-12-16 RX ORDER — IBUPROFEN 600 MG/1
600 TABLET ORAL EVERY 6 HOURS SCHEDULED
Qty: 30 TABLET | Refills: 0
Start: 2021-12-16

## 2021-12-16 RX ADMIN — IBUPROFEN 600 MG: 600 TABLET, FILM COATED ORAL at 00:30

## 2021-12-16 RX ADMIN — ACETAMINOPHEN 650 MG: 325 TABLET, FILM COATED ORAL at 08:29

## 2021-12-16 RX ADMIN — IBUPROFEN 600 MG: 600 TABLET, FILM COATED ORAL at 06:10

## 2021-12-16 RX ADMIN — DOCUSATE SODIUM 100 MG: 100 CAPSULE ORAL at 08:29

## 2021-12-16 RX ADMIN — ENOXAPARIN SODIUM 40 MG: 40 INJECTION SUBCUTANEOUS at 08:29

## 2021-12-16 RX ADMIN — IBUPROFEN 600 MG: 600 TABLET, FILM COATED ORAL at 11:31

## 2021-12-27 ENCOUNTER — POSTPARTUM VISIT (OUTPATIENT)
Dept: OBGYN CLINIC | Facility: CLINIC | Age: 26
End: 2021-12-27

## 2021-12-27 VITALS
WEIGHT: 209 LBS | HEART RATE: 83 BPM | SYSTOLIC BLOOD PRESSURE: 113 MMHG | DIASTOLIC BLOOD PRESSURE: 65 MMHG | TEMPERATURE: 98.1 F | BODY MASS INDEX: 34.82 KG/M2 | HEIGHT: 65 IN

## 2021-12-27 DIAGNOSIS — Z34.93 PRENATAL CARE, THIRD TRIMESTER: ICD-10-CM

## 2021-12-27 DIAGNOSIS — B95.1 POSITIVE GBS TEST: ICD-10-CM

## 2021-12-27 DIAGNOSIS — R73.09 GTT ABNORMAL: Primary | ICD-10-CM

## 2021-12-27 DIAGNOSIS — O99.013 ANEMIA DURING PREGNANCY IN THIRD TRIMESTER: ICD-10-CM

## 2021-12-27 DIAGNOSIS — Z98.891 STATUS POST REPEAT LOW TRANSVERSE CESAREAN SECTION: ICD-10-CM

## 2021-12-27 PROCEDURE — 99213 OFFICE O/P EST LOW 20 MIN: CPT | Performed by: OBSTETRICS & GYNECOLOGY

## 2022-06-20 ENCOUNTER — HOSPITAL ENCOUNTER (EMERGENCY)
Facility: HOSPITAL | Age: 27
Discharge: HOME | End: 2022-06-20
Attending: EMERGENCY MEDICINE | Admitting: EMERGENCY MEDICINE
Payer: COMMERCIAL

## 2022-06-20 ENCOUNTER — APPOINTMENT (EMERGENCY)
Dept: RADIOLOGY | Facility: HOSPITAL | Age: 27
End: 2022-06-20
Payer: COMMERCIAL

## 2022-06-20 VITALS
HEART RATE: 80 BPM | SYSTOLIC BLOOD PRESSURE: 120 MMHG | OXYGEN SATURATION: 100 % | TEMPERATURE: 98 F | DIASTOLIC BLOOD PRESSURE: 69 MMHG | RESPIRATION RATE: 16 BRPM

## 2022-06-20 DIAGNOSIS — Z34.90 PREGNANCY: Primary | ICD-10-CM

## 2022-06-20 DIAGNOSIS — R11.2 NAUSEA AND VOMITING: ICD-10-CM

## 2022-06-20 LAB
ALBUMIN SERPL BCP-MCNC: 3.3 G/DL (ref 3.5–5)
ALP SERPL-CCNC: 53 U/L (ref 46–116)
ALT SERPL W P-5'-P-CCNC: 25 U/L (ref 12–78)
ANION GAP SERPL CALCULATED.3IONS-SCNC: 9 MMOL/L (ref 4–13)
AST SERPL W P-5'-P-CCNC: 19 U/L (ref 5–45)
BACTERIA UR QL AUTO: NORMAL /HPF
BASOPHILS # BLD AUTO: 0.02 THOUSANDS/ΜL (ref 0–0.1)
BASOPHILS NFR BLD AUTO: 0 % (ref 0–1)
BILIRUB SERPL-MCNC: 0.16 MG/DL (ref 0.2–1)
BILIRUB UR QL STRIP: NEGATIVE
BUN SERPL-MCNC: 8 MG/DL (ref 5–25)
CALCIUM ALBUM COR SERPL-MCNC: 9.4 MG/DL (ref 8.3–10.1)
CALCIUM SERPL-MCNC: 8.8 MG/DL (ref 8.3–10.1)
CHLORIDE SERPL-SCNC: 104 MMOL/L (ref 100–108)
CLARITY UR: CLEAR
CO2 SERPL-SCNC: 21 MMOL/L (ref 21–32)
COLOR UR: YELLOW
CREAT SERPL-MCNC: 0.47 MG/DL (ref 0.6–1.3)
EOSINOPHIL # BLD AUTO: 0.02 THOUSAND/ΜL (ref 0–0.61)
EOSINOPHIL NFR BLD AUTO: 0 % (ref 0–6)
ERYTHROCYTE [DISTWIDTH] IN BLOOD BY AUTOMATED COUNT: 14.8 % (ref 11.6–15.1)
GFR SERPL CREATININE-BSD FRML MDRD: 134 ML/MIN/1.73SQ M
GLUCOSE SERPL-MCNC: 73 MG/DL (ref 65–140)
GLUCOSE UR STRIP-MCNC: NEGATIVE MG/DL
HCG SERPL QL: POSITIVE
HCT VFR BLD AUTO: 34.8 % (ref 34.8–46.1)
HGB BLD-MCNC: 11.3 G/DL (ref 11.5–15.4)
HGB UR QL STRIP.AUTO: ABNORMAL
IMM GRANULOCYTES # BLD AUTO: 0.02 THOUSAND/UL (ref 0–0.2)
IMM GRANULOCYTES NFR BLD AUTO: 0 % (ref 0–2)
KETONES UR STRIP-MCNC: NEGATIVE MG/DL
LEUKOCYTE ESTERASE UR QL STRIP: NEGATIVE
LYMPHOCYTES # BLD AUTO: 2.11 THOUSANDS/ΜL (ref 0.6–4.47)
LYMPHOCYTES NFR BLD AUTO: 26 % (ref 14–44)
MCH RBC QN AUTO: 27.5 PG (ref 26.8–34.3)
MCHC RBC AUTO-ENTMCNC: 32.5 G/DL (ref 31.4–37.4)
MCV RBC AUTO: 85 FL (ref 82–98)
MONOCYTES # BLD AUTO: 0.37 THOUSAND/ΜL (ref 0.17–1.22)
MONOCYTES NFR BLD AUTO: 5 % (ref 4–12)
NEUTROPHILS # BLD AUTO: 5.48 THOUSANDS/ΜL (ref 1.85–7.62)
NEUTS SEG NFR BLD AUTO: 69 % (ref 43–75)
NITRITE UR QL STRIP: NEGATIVE
NON-SQ EPI CELLS URNS QL MICRO: NORMAL /HPF
NRBC BLD AUTO-RTO: 0 /100 WBCS
PH UR STRIP.AUTO: 7 [PH] (ref 4.5–8)
PLATELET # BLD AUTO: 246 THOUSANDS/UL (ref 149–390)
PMV BLD AUTO: 9.3 FL (ref 8.9–12.7)
POTASSIUM SERPL-SCNC: 3.9 MMOL/L (ref 3.5–5.3)
PROT SERPL-MCNC: 7.7 G/DL (ref 6.4–8.2)
PROT UR STRIP-MCNC: NEGATIVE MG/DL
RBC # BLD AUTO: 4.11 MILLION/UL (ref 3.81–5.12)
RBC #/AREA URNS AUTO: NORMAL /HPF
SODIUM SERPL-SCNC: 134 MMOL/L (ref 136–145)
SP GR UR STRIP.AUTO: 1.01 (ref 1–1.03)
TSH SERPL DL<=0.05 MIU/L-ACNC: 0.72 UIU/ML (ref 0.45–4.5)
UROBILINOGEN UR QL STRIP.AUTO: 0.2 E.U./DL
WBC # BLD AUTO: 8.02 THOUSAND/UL (ref 4.31–10.16)
WBC #/AREA URNS AUTO: NORMAL /HPF

## 2022-06-20 PROCEDURE — 84443 ASSAY THYROID STIM HORMONE: CPT | Performed by: EMERGENCY MEDICINE

## 2022-06-20 PROCEDURE — 80053 COMPREHEN METABOLIC PANEL: CPT | Performed by: EMERGENCY MEDICINE

## 2022-06-20 PROCEDURE — 81001 URINALYSIS AUTO W/SCOPE: CPT

## 2022-06-20 PROCEDURE — 99284 EMERGENCY DEPT VISIT MOD MDM: CPT | Performed by: EMERGENCY MEDICINE

## 2022-06-20 PROCEDURE — 76815 OB US LIMITED FETUS(S): CPT | Performed by: EMERGENCY MEDICINE

## 2022-06-20 PROCEDURE — 36415 COLL VENOUS BLD VENIPUNCTURE: CPT | Performed by: EMERGENCY MEDICINE

## 2022-06-20 PROCEDURE — 96360 HYDRATION IV INFUSION INIT: CPT

## 2022-06-20 PROCEDURE — 85025 COMPLETE CBC W/AUTO DIFF WBC: CPT | Performed by: EMERGENCY MEDICINE

## 2022-06-20 PROCEDURE — 99284 EMERGENCY DEPT VISIT MOD MDM: CPT

## 2022-06-20 PROCEDURE — 84703 CHORIONIC GONADOTROPIN ASSAY: CPT | Performed by: EMERGENCY MEDICINE

## 2022-06-20 RX ORDER — KETOROLAC TROMETHAMINE 30 MG/ML
15 INJECTION, SOLUTION INTRAMUSCULAR; INTRAVENOUS ONCE
Status: DISCONTINUED | OUTPATIENT
Start: 2022-06-20 | End: 2022-06-20

## 2022-06-20 RX ORDER — ONDANSETRON 2 MG/ML
4 INJECTION INTRAMUSCULAR; INTRAVENOUS ONCE
Status: DISCONTINUED | OUTPATIENT
Start: 2022-06-20 | End: 2022-06-20 | Stop reason: HOSPADM

## 2022-06-20 RX ORDER — ONDANSETRON 4 MG/1
4 TABLET, FILM COATED ORAL EVERY 6 HOURS
Qty: 12 TABLET | Refills: 0 | Status: SHIPPED | OUTPATIENT
Start: 2022-06-20

## 2022-06-20 RX ORDER — METOCLOPRAMIDE HYDROCHLORIDE 5 MG/ML
10 INJECTION INTRAMUSCULAR; INTRAVENOUS ONCE
Status: DISCONTINUED | OUTPATIENT
Start: 2022-06-20 | End: 2022-06-20

## 2022-06-20 RX ORDER — ACETAMINOPHEN 325 MG/1
975 TABLET ORAL ONCE
Status: COMPLETED | OUTPATIENT
Start: 2022-06-20 | End: 2022-06-20

## 2022-06-20 RX ADMIN — SODIUM CHLORIDE 1000 ML: 0.9 INJECTION, SOLUTION INTRAVENOUS at 18:00

## 2022-06-20 RX ADMIN — ACETAMINOPHEN 975 MG: 325 TABLET ORAL at 18:00

## 2022-06-20 NOTE — ED ATTENDING ATTESTATION
6/20/2022  IAlisson MD, saw and evaluated the patient  I have discussed the patient with the resident/non-physician practitioner and agree with the resident's/non-physician practitioner's findings, Plan of Care, and MDM as documented in the resident's/non-physician practitioner's note, except where noted  All available labs and Radiology studies were reviewed  I was present for key portions of any procedure(s) performed by the resident/non-physician practitioner and I was immediately available to provide assistance  At this point I agree with the current assessment done in the Emergency Department  I have conducted an independent evaluation of this patient a history and physical is as follows:    ED Course         Critical Care Time  Procedures    26 yo female with hx of migraines having persistent headache for one month, vomiting, headaches worse in the morning  Pt with recent delivery in December and now breast feeding  Pt with no numbness, tingling, weakness in extremities  Vss, afebrile, lungs cta, rrr, abdomen soft nontender, no focal neuro deficits  Labs, ct head  Pain meds for migraine

## 2022-06-20 NOTE — ED PROVIDER NOTES
History  Chief Complaint   Patient presents with    Migraine     Patient complains of migraines for the past month  also reports feeling nauseous today, vomited yesterday, weak, and episodes of high blood pressure  23-year-old female past medical history significant for recent  in December that presents ED today for constant headache for the last month  Patient says that she has also been nauseous and vomiting for the past month as well  She says that the headache is worse in the morning  She did have anemia pregnancy and post pregnancy however she has not had any follow-up since her post surgical procedure follow-up  She also saying that she is generally weak over last month as well  She denies any chest pain or shortness of breath  No fevers or chills  She has been taking Tylenol at home with some relief from the pain of the headache gets worse  She said that the pain is constant is worse there  It does wax and wane it is worse in the morning and at night  Prior to Admission Medications   Prescriptions Last Dose Informant Patient Reported? Taking? Prenatal Vit-Fe Fumarate-FA (prenatal multivitamin) 28-0 8 MG TABS  Self Yes No   Sig: Take 1 tablet by mouth daily   acetaminophen (TYLENOL) 325 mg tablet   No No   Sig: Take 2 tablets (650 mg total) by mouth every 6 (six) hours   Patient not taking: Reported on 2021    ferrous sulfate 325 (65 Fe) mg tablet  Self Yes No   Sig: Take 325 mg by mouth daily with breakfast   Patient not taking: Reported on 2021    ibuprofen (MOTRIN) 600 mg tablet   No No   Sig: Take 1 tablet (600 mg total) by mouth every 6 (six) hours   Patient not taking: Reported on 2021       Facility-Administered Medications: None       History reviewed  No pertinent past medical history      Past Surgical History:   Procedure Laterality Date     SECTION      [place]  DELIVERY ONLY N/A 2021    Procedure:  SECTION () REPEAT;  Surgeon: Ludin Vincent MD;  Location: [facility] ;  Service: Obstetrics       Family History   Problem Relation Age of Onset    Diabetes Mother     Hyperlipidemia Mother     Ataxia Father     Venous thrombosis Maternal Grandmother     Heart attack Maternal Grandmother     Ataxia Maternal Grandfather      I have reviewed and agree with the history as documented  E-Cigarette/Vaping    E-Cigarette Use Never User      E-Cigarette/Vaping Substances    Nicotine No     THC No     CBD No     Flavoring No     Other No     Unknown No      Social History     Tobacco Use    Smoking status: Never Smoker    Smokeless tobacco: Never Used   Vaping Use    Vaping Use: Never used   Substance Use Topics    Alcohol use: Never    Drug use: Never        Review of Systems   Constitutional: Negative for chills and fever  HENT: Negative for hearing loss  Eyes: Negative for visual disturbance  Respiratory: Negative for shortness of breath  Cardiovascular: Negative for chest pain  Gastrointestinal: Positive for nausea and vomiting  Negative for abdominal pain, constipation and diarrhea  Genitourinary: Negative for difficulty urinating  Musculoskeletal: Negative for myalgias  Skin: Negative for color change  Neurological: Positive for headaches  Negative for dizziness  Psychiatric/Behavioral: Negative for agitation  All other systems reviewed and are negative  Physical Exam  ED Triage Vitals [06/20/22 1650]   Temperature Pulse Respirations Blood Pressure SpO2   98 °F (36 7 °C) 80 16 120/69 100 %      Temp Source Heart Rate Source Patient Position - Orthostatic VS BP Location FiO2 (%)   Oral -- Sitting Right arm --      Pain Score       5             Orthostatic Vital Signs  Vitals:    06/20/22 1650   BP: 120/69   Pulse: 80   Patient Position - Orthostatic VS: Sitting       Physical Exam  Vitals and nursing note reviewed  Constitutional:       General: She is not in acute distress  Appearance: Normal appearance  She is well-developed  She is not ill-appearing  HENT:      Head: Normocephalic and atraumatic  Right Ear: External ear normal       Left Ear: External ear normal       Nose: Nose normal  No congestion  Mouth/Throat:      Mouth: Mucous membranes are moist       Pharynx: Oropharynx is clear  No oropharyngeal exudate  Eyes:      General:         Right eye: No discharge  Left eye: No discharge  Extraocular Movements: Extraocular movements intact  Conjunctiva/sclera: Conjunctivae normal       Pupils: Pupils are equal, round, and reactive to light  Cardiovascular:      Rate and Rhythm: Normal rate and regular rhythm  Heart sounds: Normal heart sounds  No murmur heard  No friction rub  No gallop  Pulmonary:      Effort: Pulmonary effort is normal  No respiratory distress  Breath sounds: Normal breath sounds  No stridor  No wheezing  Abdominal:      General: Bowel sounds are normal  There is no distension  Palpations: Abdomen is soft  Tenderness: There is no abdominal tenderness  Musculoskeletal:         General: No swelling  Normal range of motion  Cervical back: Normal range of motion and neck supple  No rigidity  Skin:     General: Skin is warm and dry  Capillary Refill: Capillary refill takes less than 2 seconds  Neurological:      General: No focal deficit present  Mental Status: She is alert and oriented to person, place, and time  Mental status is at baseline  Cranial Nerves: No cranial nerve deficit  Sensory: No sensory deficit  Motor: No weakness        Coordination: Coordination normal       Gait: Gait normal    Psychiatric:         Mood and Affect: Mood normal          Behavior: Behavior normal          ED Medications  Medications   ondansetron (ZOFRAN) injection 4 mg (0 mg Intravenous Hold 6/20/22 1832)   sodium chloride 0 9 % bolus 1,000 mL (0 mL Intravenous Stopped 6/20/22 1900) acetaminophen (TYLENOL) tablet 975 mg (975 mg Oral Given 6/20/22 1800)       Diagnostic Studies  Results Reviewed     Procedure Component Value Units Date/Time    hCG, qualitative pregnancy [[id number]]  (Abnormal) Collected: 06/20/22 1728    Lab Status: Final result Specimen: Blood from Arm, Right Updated: 06/20/22 1837     Preg, Serum Positive    Urine Microscopic [[id number]]  (Normal) Collected: 06/20/22 1745    Lab Status: Final result Specimen: Urine, Clean Catch Updated: 06/20/22 1836     RBC, UA 1-2 /hpf      WBC, UA None Seen /hpf      Epithelial Cells Occasional /hpf      Bacteria, UA Occasional /hpf     TSH, 3rd generation with Free T4 reflex [[id number]]  (Normal) Collected: 06/20/22 1728    Lab Status: Final result Specimen: Blood from Arm, Right Updated: 06/20/22 1822     TSH 3RD GENERATON 0 722 uIU/mL     Narrative:      Patients undergoing fluorescein dye angiography may retain small amounts of fluorescein in the body for 48-72 hours post procedure  Samples containing fluorescein can produce falsely depressed TSH values  If the patient had this procedure,a specimen should be resubmitted post fluorescein clearance        Comprehensive metabolic panel [[id number]]  (Abnormal) Collected: 06/20/22 1728    Lab Status: Final result Specimen: Blood from Arm, Right Updated: 06/20/22 1816     Sodium 134 mmol/L      Potassium 3 9 mmol/L      Chloride 104 mmol/L      CO2 21 mmol/L      ANION GAP 9 mmol/L      BUN 8 mg/dL      Creatinine 0 47 mg/dL      Glucose 73 mg/dL      Calcium 8 8 mg/dL      Corrected Calcium 9 4 mg/dL      AST 19 U/L      ALT 25 U/L      Alkaline Phosphatase 53 U/L      Total Protein 7 7 g/dL      Albumin 3 3 g/dL      Total Bilirubin 0 16 mg/dL      eGFR 134 ml/min/1 73sq m     Narrative:      [place] guidelines for Chronic Kidney Disease (CKD):     Stage 1 with normal or high GFR (GFR > 90 mL/min/1 73 square meters)    Stage 2 Mild CKD (GFR = 60-89 mL/min/1 73 square meters)    Stage 3A Moderate CKD (GFR = 45-59 mL/min/1 73 square meters)    Stage 3B Moderate CKD (GFR = 30-44 mL/min/1 73 square meters)    Stage 4 Severe CKD (GFR = 15-29 mL/min/1 73 square meters)    Stage 5 End Stage CKD (GFR <15 mL/min/1 73 square meters)  Note: GFR calculation is accurate only with a steady state creatinine    Urine Macroscopic, POC [[id number]]  (Abnormal) Collected: 06/20/22 1745    Lab Status: Final result Specimen: Urine Updated: 06/20/22 1747     Color, UA Yellow     Clarity, UA Clear     pH, UA 7 0     Leukocytes, UA Negative     Nitrite, UA Negative     Protein, UA Negative mg/dl      Glucose, UA Negative mg/dl      Ketones, UA Negative mg/dl      Urobilinogen, UA 0 2 E U /dl      Bilirubin, UA Negative     Blood, UA Trace     Specific [place], UA 1 015    Narrative:      CLINITEK RESULT    CBC and differential [[id number]]  (Abnormal) Collected: 06/20/22 1728    Lab Status: Final result Specimen: Blood from Arm, Right Updated: 06/20/22 1743     WBC 8 02 Thousand/uL      RBC 4 11 Million/uL      Hemoglobin 11 3 g/dL      Hematocrit 34 8 %      MCV 85 fL      MCH 27 5 pg      MCHC 32 5 g/dL      RDW 14 8 %      MPV 9 3 fL      Platelets 302 Thousands/uL      nRBC 0 /100 WBCs      Neutrophils Relative 69 %      Immat GRANS % 0 %      Lymphocytes Relative 26 %      Monocytes Relative 5 %      Eosinophils Relative 0 %      Basophils Relative 0 %      Neutrophils Absolute 5 48 Thousands/µL      Immature Grans Absolute 0 02 Thousand/uL      Lymphocytes Absolute 2 11 Thousands/µL      Monocytes Absolute 0 37 Thousand/µL      Eosinophils Absolute 0 02 Thousand/µL      Basophils Absolute 0 02 Thousands/µL                  No orders to display         Procedures  POC Pelvic US    Date/Time: 6/20/2022 6:03 PM  Performed by: Cliff Rahman MD  Authorized by:  Cliff Rahman MD     Patient location:  ED  Other Assisting Provider: No    Procedure details:     Exam Type:  Diagnostic Indications: evaluate for IUP      Assessment for: confirm intrauterine pregnancy and evaluate fetal viability      Technique:  Transabdominal obstetric (HCG+) exam    Views obtained: uterus (transverse and sagittal)      Image quality: diagnostic      Image availability:  Still images obtained  Uterine findings:     Intrauterine pregnancy: identified      Single gestation: identified      Fetal heart rate: identified      Fetal heart rate (bpm):  148  Other findings:     Free pelvic fluid: not identified      Free peritoneal fluid: not identified    Interpretation:     Pregnancy findings: intrauterine pregnancy (IUP)            ED Course                             SBIRT 20yo+    Flowsheet Row Most Recent Value   SBIRT (25 yo +)    In order to provide better care to our patients, we are screening all of our patients for alcohol and drug use  Would it be okay to ask you these screening questions? Unable to answer at this time Filed at: 06/20/2022 1706                MDM  Number of Diagnoses or Management Options  Nausea and vomiting  Pregnancy  Diagnosis management comments: 68-year-old female presenting to the ED today for headache and nausea and vomiting and weakness  At this time while patient does look clinically well would like to rule out anemia with a CBC, electrolyte with a CMP, TSH to evaluate for thyroid disease  Will also scan head without contrast to rule out malignancy  Prior to CT scan occurring patient's urine pregnancy actually came back positive  Point care ultrasound at the bedside shows intrauterine pregnancy with good fetal heart rate  Her symptoms now likely due to unknown pregnancy  Will still await for lab work  Will treat her symptomatically with fluids and Tylenol  Patient currently declining IV Zofran for nausea she is not nauseous  CT scan was canceled  Lab work unremarkable  Will encourage patient to follow-up as an outpatient with her OB provider    Strict return to ER precautions given and the patient was discharged home  Disposition  Final diagnoses:   Pregnancy   Nausea and vomiting     Time reflects when diagnosis was documented in both MDM as applicable and the Disposition within this note     Time User Action Codes Description Comment    6/20/2022  6:00 PM Cheryln Sick Add [Z34 90] Pregnancy     6/20/2022  6:59 PM Cheryln Sick Add [R11 2] Nausea and vomiting       ED Disposition     ED Disposition   Discharge    Condition   Stable    Date/Time   Mon Jun 20, 2022  6:59 PM    Comment   Johann Haynes discharge to home/self care                 Follow-up Information     Follow up With Specialties Details Why Contact Info Additional [address] [facility] Ob[facility]etric[facility] Gy[facility] Schedule an appointment as soon as possible for a visit in 2 days for follow up [address] [place] [address], [facility], [address], [place], [address]    [address] [place] [address] 34 [facility] Emergency Department Emergency Medicine Go to  If symptoms worsen, As needed [address]ail[address] [phone]  [address] Emergency Department, [address], [address], [place], [address]          Discharge Medication List as of 6/20/2022  7:01 PM      START taking these medications    Details   ondansetron (ZOFRAN) 4 mg tablet Take 1 tablet (4 mg total) by mouth every 6 (six) hours, Starting Mon 6/20/2022, Normal         CONTINUE these medications which have NOT CHANGED    Details   acetaminophen (TYLENOL) 325 mg tablet Take 2 tablets (650 mg total) by mouth every 6 (six) hours, Starting u 12/16/2021, No Print      ferrous sulfate 325 (65 Fe) mg tablet Take 325 mg by mouth daily with breakfast, Historical Med      ibuprofen (MOTRIN) 600 mg tablet Take 1 tablet (600 mg total) by mouth every 6 (six) hours, Starting Thu 12/16/2021, No Print      Prenatal Vit-Fe Fumarate-FA (prenatal multivitamin) 28-0 8 MG TABS Take 1 tablet by mouth daily, Historical Med           No discharge procedures on file  PDMP Review     None           ED Provider  Attending physically available and evaluated Edwin Avila [employer]  I managed the patient along with the ED Attending      Electronically Signed by         Michelle Pugh MD  06/20/22 9261

## 2022-06-20 NOTE — DISCHARGE INSTRUCTIONS
You were found to be pregnant today  Please follow up with OB at the office below  You can take 1 tablet of the zofran every 6 hours for nausea  Return to the ED if you have any concerns

## 2022-06-20 NOTE — ED NOTES
Camille Coats EDT, performed valid pregnancy test with positive result       Yulissa Beasley  06/20/22 9749

## 2022-06-30 ENCOUNTER — ULTRASOUND (OUTPATIENT)
Dept: OBGYN CLINIC | Facility: CLINIC | Age: 27
End: 2022-06-30

## 2022-06-30 VITALS
DIASTOLIC BLOOD PRESSURE: 75 MMHG | HEART RATE: 83 BPM | WEIGHT: 202 LBS | SYSTOLIC BLOOD PRESSURE: 124 MMHG | HEIGHT: 65 IN | BODY MASS INDEX: 33.66 KG/M2

## 2022-06-30 DIAGNOSIS — Z3A.13 13 WEEKS GESTATION OF PREGNANCY: ICD-10-CM

## 2022-06-30 DIAGNOSIS — Z32.01 POSITIVE PREGNANCY TEST: Primary | ICD-10-CM

## 2022-06-30 DIAGNOSIS — Z34.92 PRENATAL CARE, SECOND TRIMESTER: ICD-10-CM

## 2022-06-30 LAB — SL AMB POCT URINE HCG: POSITIVE

## 2022-06-30 PROCEDURE — 76815 OB US LIMITED FETUS(S): CPT | Performed by: OBSTETRICS & GYNECOLOGY

## 2022-06-30 PROCEDURE — 81025 URINE PREGNANCY TEST: CPT | Performed by: OBSTETRICS & GYNECOLOGY

## 2022-06-30 RX ORDER — SWAB
1 SWAB, NON-MEDICATED MISCELLANEOUS DAILY
Qty: 30 TABLET | Refills: 3 | Status: SHIPPED | OUTPATIENT
Start: 2022-06-30

## 2022-06-30 RX ORDER — DIPHENHYDRAMINE HYDROCHLORIDE 25 MG/1
25 CAPSULE ORAL DAILY
Qty: 30 TABLET | Refills: 0 | Status: SHIPPED | OUTPATIENT
Start: 2022-06-30

## 2022-06-30 NOTE — PROGRESS NOTES
[id number] [facility]   Viability Scan  Name: Aguilar Laird  MRN: [id number]  : 1995      ASSESSMENT/PLAN:  Problem   13 Weeks Gestation of Pregnancy    Single intrauterine pregnancy  CRL and BPD measurement completed, GA 13w2d  Patient desires pregnancy termination  Information given regarding [facility]  Discussed with patient that we are available for support with whatever she chooses  Vit B6/Unisom for N/V management  Prenatal panel ordered if patient decides to pursue pregnancy  Patient to make appointment 2w after surgical management completed  Plan to address contraception, patient currently desires Nexplanon placement, has had device implanted in the past           SUBJECTIVE:  Patient presents with recent positive pregnancy test with recent delivery in 2021  Patient had been sexually active with her  for the past several months and denies using contraception  She has not had a period since delivery and has been actively breastfeeding  She was surprised when she started having more frequent nausea and vomiting episodes, and had a recent positive home pregnancy test  We discussed her options moving forward and at this time this is an undesired pregnancy  We discussed following up with [facility] given that her ultrasound results place gestation at the end of the first trimester  Patient was appropriately tearful and expressed gratitude for our support  I encouraged the patient to call the office with any questions and concerns moving forward, and if she changes her decision that I would be happy to initiate prenatal care with her  Otherwise, plan for follow-up 2 weeks postoperatively to discuss contraception management  OBJECTIVE:  Vitals:    22 1335   BP: 124/75   Pulse: 83       Physical Exam  Vitals and nursing note reviewed  Exam conducted with a chaperone present  Constitutional:       General: She is not in acute distress  Appearance: She is obese  HENT:      Head: Normocephalic  Right Ear: External ear normal       Left Ear: External ear normal    Eyes:      General:         Right eye: No discharge  Left eye: No discharge  Conjunctiva/sclera: Conjunctivae normal    Cardiovascular:      Rate and Rhythm: Normal rate  Pulses: Normal pulses  Pulmonary:      Effort: Pulmonary effort is normal  No respiratory distress  Abdominal:      Palpations: Abdomen is soft  Tenderness: There is no abdominal tenderness  There is no guarding  Comments: Gravid uterus   Musculoskeletal:         General: No swelling or tenderness  Normal range of motion  Cervical back: Normal range of motion  Right lower leg: No edema  Left lower leg: No edema  Skin:     General: Skin is warm and dry  Capillary Refill: Capillary refill takes less than 2 seconds  Neurological:      Mental Status: She is alert and oriented to person, place, and time  Mental status is at baseline  Psychiatric:         Mood and Affect: Mood normal          Behavior: Behavior normal        FIRST TRIMESTER OBSTETRIC ULTRASOUND  6/30/2022  Mahin Forte MD     INDICATION: Amenorrhea, viability    COMPARISON: None  TECHNIQUE:   Transabdominal imaging was performed to assess the gestation, myometrial/endometrial architecture    The study includes volumetric sweeps and traditional still imaging technique  FINDINGS:     A single intrauterine gestation is identified  Cardiac activity is detected at 158bpm       MEAN CROWN RUMP LENGTH:  7 14 cm = 13 weeks 2 days   BIPARIETAL DIAMETER: 2 15 cm = 13 weeks 4 days  AMNIOTIC FLUID/SAC SHAPE:  Within expected normal range  IMPRESSION:    Late first trimester ultrasound, about 13 weeks gestation  Final JANNET: 1/3/2023  Gestational age: 13w2d  Fetal cardiac activity detected    Recommend formal ultrasound study with perinatology                    Mahin Forte MD  OB/GYN PGY-2  6/30/2022  2:17 PM

## 2022-12-22 ENCOUNTER — VBI (OUTPATIENT)
Dept: ADMINISTRATIVE | Facility: OTHER | Age: 27
End: 2022-12-22

## 2023-04-24 ENCOUNTER — OFFICE VISIT (OUTPATIENT)
Dept: OBGYN CLINIC | Facility: CLINIC | Age: 28
End: 2023-04-24

## 2023-04-24 VITALS
SYSTOLIC BLOOD PRESSURE: 112 MMHG | BODY MASS INDEX: 31.25 KG/M2 | RESPIRATION RATE: 18 BRPM | DIASTOLIC BLOOD PRESSURE: 78 MMHG | WEIGHT: 187.6 LBS | HEART RATE: 67 BPM | HEIGHT: 65 IN

## 2023-04-24 DIAGNOSIS — Z30.09 BIRTH CONTROL COUNSELING: Primary | ICD-10-CM

## 2023-04-24 DIAGNOSIS — Z32.02 PREGNANCY TEST NEGATIVE: ICD-10-CM

## 2023-04-24 PROBLEM — Z34.93 PRENATAL CARE, THIRD TRIMESTER: Status: RESOLVED | Noted: 2021-12-06 | Resolved: 2023-04-24

## 2023-04-24 PROBLEM — O09.30 LATE PRENATAL CARE: Status: RESOLVED | Noted: 2021-09-21 | Resolved: 2023-04-24

## 2023-04-24 PROBLEM — B95.1 POSITIVE GBS TEST: Status: RESOLVED | Noted: 2021-11-19 | Resolved: 2023-04-24

## 2023-04-24 PROBLEM — O99.213 MATERNAL OBESITY, ANTEPARTUM, THIRD TRIMESTER: Status: RESOLVED | Noted: 2021-09-27 | Resolved: 2023-04-24

## 2023-04-24 PROBLEM — R73.09 GTT ABNORMAL: Status: RESOLVED | Noted: 2021-12-06 | Resolved: 2023-04-24

## 2023-04-24 PROBLEM — O99.013 ANEMIA DURING PREGNANCY IN THIRD TRIMESTER: Status: RESOLVED | Noted: 2021-11-22 | Resolved: 2023-04-24

## 2023-04-24 PROBLEM — Z98.891 HISTORY OF CESAREAN DELIVERY: Status: RESOLVED | Noted: 2021-09-21 | Resolved: 2023-04-24

## 2023-04-24 PROBLEM — Z3A.13 13 WEEKS GESTATION OF PREGNANCY: Status: RESOLVED | Noted: 2022-06-30 | Resolved: 2023-04-24

## 2023-04-24 PROBLEM — R03.0 ELEVATED BLOOD PRESSURE READING IN OFFICE WITHOUT DIAGNOSIS OF HYPERTENSION: Status: RESOLVED | Noted: 2021-12-07 | Resolved: 2023-04-24

## 2023-04-24 LAB — SL AMB POCT URINE HCG: NEGATIVE

## 2023-04-24 RX ORDER — MEDROXYPROGESTERONE ACETATE 150 MG/ML
150 INJECTION, SUSPENSION INTRAMUSCULAR
Qty: 1 ML | Refills: 3 | Status: SHIPPED | OUTPATIENT
Start: 2023-04-24

## 2023-04-24 NOTE — PROGRESS NOTES
PROBLEM GYNECOLOGICAL VISIT    Edwin Roberts is a 32 y o  female who presents today for contraception  Her general medical history has been reviewed and she reports it as follows:    History reviewed  No pertinent past medical history  Past Surgical History:   Procedure Laterality Date   •  SECTION     • [place]  DELIVERY ONLY N/A 2021    Procedure:  SECTION () REPEAT;  Surgeon: Jesus Pereira MD;  Location: [facility] ;  Service: Obstetrics     OB History        3    Para   2    Term   2       0    AB   1    Living   2       SAB   0    IAB   1    Ectopic   0    Multiple   0    Live Births   2               Social History     Tobacco Use   • Smoking status: Never   • Smokeless tobacco: Never   Vaping Use   • Vaping Use: Never used   Substance Use Topics   • Alcohol use: Never   • Drug use: Never     Social History     Substance and Sexual Activity   Sexual Activity Yes   • Partners: Male   • Birth control/protection: None       Current Outpatient Medications   Medication Instructions   • acetaminophen (TYLENOL) 650 mg, Oral, Every 6 hours scheduled   • doxylamine (UNISOM) 25 mg, Oral, Daily at bedtime PRN   • ferrous sulfate 325 mg, Daily with breakfast   • ibuprofen (MOTRIN) 600 mg, Oral, Every 6 hours scheduled   • medroxyPROGESTERone (DEPO-PROVERA) 150 mg, Intramuscular, Every 3 months   • ondansetron (ZOFRAN) 4 mg, Oral, Every 6 hours   • Prenatal Vit-Fe Fumarate-FA (prenatal multivitamin) 28-0 8 MG TABS 1 tablet, Oral, Daily   • vitamin B-6 25 mg, Oral, Daily       History of Present Illness:   Edwin presents today to discuss contraception  She has used Nexplanon in the past and she would like to have a new one inserted  She is currently not using any contraception and has recently become sexually active again  She reports that she had to take a plan B a few weeks ago after an episode of unprotected intercourse       Review of Systems:  Review of Systems "  Constitutional: Negative  Gastrointestinal: Negative  Genitourinary: Negative  Physical Exam:  /78 (BP Location: Right arm, Patient Position: Sitting, Cuff Size: Adult)   Pulse 67   Resp 18   Ht 5' 5\" (1 651 m)   Wt 85 1 kg (187 lb 9 6 oz)   LMP 04/08/2023 (Exact Date)   BMI 31 22 kg/m²   Physical Exam  Constitutional:       General: She is not in acute distress  Appearance: Normal appearance  Neurological:      Mental Status: She is alert  Skin:     General: Skin is dry  Psychiatric:         Mood and Affect: Mood normal          Behavior: Behavior normal    Vitals reviewed  Point of Care Testing:   -urine pregnancy test: negative       Assessment:   1  Birth control counseling     Plan:   1  Discussed nexplanon insertion/removal procedures, risks/benefits and expected bleeding patterns  Order forms for nexplanon completed in office today  2  She would like to receive a dose of depo-provera as a bridge to her nexplanon insertion  Discussed need for backup method x7 days after depo  3  Declines STI screening  4  Will return this afternoon for depo administration and will return for nexplanon insertion when devise is delivered  Reviewed with patient that [facility], but that they will not necessarily be reviewed by me immediately  Explained that I will review results at my earliest opportunity and contact patient appropriately    "

## 2023-04-24 NOTE — PROGRESS NOTES
Depo-Provera     • [x]   Patient provided box yes  o 3 Refills remain  o Refills submitted no  • Last  Annual Date / Birth control check : 4/24/2023  • Last Depo date: 4/24/23 first depo  • Side effects: no  • HCG: yes  o if applicable: negative  • Given by: Darrell Colindres  • Site: Right deltoid    o Calcium supplement daily teaching  o Condoms for 2 weeks following first injection dose

## 2023-05-12 ENCOUNTER — TELEPHONE (OUTPATIENT)
Dept: OBGYN CLINIC | Facility: CLINIC | Age: 28
End: 2023-05-12

## 2023-05-12 NOTE — TELEPHONE ENCOUNTER
Attempted to call, and left a detailed message, ok per communication consent form  Advised patient that the specialty pharmacy needs her consent to send the nexplanon to the office  Provided their number in the message  Office number also provided in case of any questions

## 2023-05-18 ENCOUNTER — TELEPHONE (OUTPATIENT)
Dept: OBGYN CLINIC | Facility: CLINIC | Age: 28
End: 2023-05-18

## 2023-05-18 NOTE — TELEPHONE ENCOUNTER
Attempted to call, left a message asking for patient to call the office  Office number provided  Specialty pharmacy needs her verbal consent to send the n explanon to the office   Please have her call the specialty pharmacy at

## 2023-05-18 NOTE — TELEPHONE ENCOUNTER
Second attempt made to reach patient  Provided message with office number, asking for patient to call the office

## 2023-05-26 ENCOUNTER — TELEPHONE (OUTPATIENT)
Dept: OBGYN CLINIC | Facility: CLINIC | Age: 28
End: 2023-05-26

## 2023-05-26 NOTE — TELEPHONE ENCOUNTER
Attempted to call, left a message asking for patient to call the specialty pharmacy to provide them with verbal consent to ship the nexplanon to the office  Provided their number in the message - [phone]  Office number also provided in case of any questions

## 2023-07-14 ENCOUNTER — CLINICAL SUPPORT (OUTPATIENT)
Dept: OBGYN CLINIC | Facility: CLINIC | Age: 28
End: 2023-07-14

## 2023-07-14 DIAGNOSIS — Z30.42 ENCOUNTER FOR MANAGEMENT AND INJECTION OF DEPO-PROVERA: ICD-10-CM

## 2023-07-14 DIAGNOSIS — Z32.02 PREGNANCY TEST NEGATIVE: Primary | ICD-10-CM

## 2023-07-14 LAB — SL AMB POCT URINE HCG: NEGATIVE

## 2023-07-14 PROCEDURE — 81025 URINE PREGNANCY TEST: CPT

## 2023-07-14 PROCEDURE — 96372 THER/PROPH/DIAG INJ SC/IM: CPT

## 2023-07-14 RX ORDER — MEDROXYPROGESTERONE ACETATE 150 MG/ML
150 INJECTION, SUSPENSION INTRAMUSCULAR ONCE
Status: COMPLETED | OUTPATIENT
Start: 2023-07-14 | End: 2023-07-14

## 2023-07-14 RX ADMIN — MEDROXYPROGESTERONE ACETATE 150 MG: 150 INJECTION, SUSPENSION INTRAMUSCULAR at 10:33

## 2023-07-14 NOTE — PROGRESS NOTES
Depo-Provera     • [x]   Patient provided box yes  o 2 Refills remain  o Refills submitted no  • Last  Annual Date / Birth control check : 04/24/2023  • Last Depo date: 04/24/2023  • Side effects: no  • HCG: yes  o if applicable: negative  • Given by: Eric Aguilar  • Site: RD    o Calcium supplement daily teaching  o Condoms for 2 weeks following first injection dose.

## 2023-08-23 ENCOUNTER — VBI (OUTPATIENT)
Dept: ADMINISTRATIVE | Facility: OTHER | Age: 28
End: 2023-08-23

## (undated) DEVICE — GLOVE INDICATOR PI UNDERGLOVE SZ 7 BLUE

## (undated) DEVICE — CHLORAPREP HI-LITE 26ML ORANGE

## (undated) DEVICE — SUT MONOCRYL 3-0 UNDYED KS CS-1 Y523H

## (undated) DEVICE — ADHESIVE SKIN HIGH VISCOSITY EXOFIN 1ML

## (undated) DEVICE — MEDI-VAC YANKAUER SUCTION HANDLE W/STRAIGHT TIP & CONTROL VENT: Brand: CARDINAL HEALTH

## (undated) DEVICE — SUT VICRYL 0 CTX 36 IN J978H

## (undated) DEVICE — SUT VICRYL 0 CT-1 27 IN J260H

## (undated) DEVICE — Device

## (undated) DEVICE — ADHESIVE SKN CLSR HISTOACRYL FLEX 0.5ML LF

## (undated) DEVICE — PACK C-SECTION PBDS

## (undated) DEVICE — GLOVE SRG BIOGEL ECLIPSE 7

## (undated) DEVICE — SKIN MARKER DUAL TIP WITH RULER CAP, FLEXIBLE RULER AND LABELS: Brand: DEVON